# Patient Record
Sex: FEMALE | Race: WHITE | NOT HISPANIC OR LATINO | Employment: OTHER | URBAN - METROPOLITAN AREA
[De-identification: names, ages, dates, MRNs, and addresses within clinical notes are randomized per-mention and may not be internally consistent; named-entity substitution may affect disease eponyms.]

---

## 2017-08-11 ENCOUNTER — HOSPITAL ENCOUNTER (INPATIENT)
Facility: HOSPITAL | Age: 82
LOS: 1 days | Discharge: HOME HEALTH | DRG: 082 | End: 2017-08-12
Attending: PSYCHIATRY & NEUROLOGY | Admitting: PSYCHIATRY & NEUROLOGY
Payer: MEDICARE

## 2017-08-11 DIAGNOSIS — I60.9 SAH (SUBARACHNOID HEMORRHAGE): ICD-10-CM

## 2017-08-11 DIAGNOSIS — I48.0 PAROXYSMAL ATRIAL FIBRILLATION: ICD-10-CM

## 2017-08-11 DIAGNOSIS — I63.411 EMBOLIC STROKE INVOLVING RIGHT MIDDLE CEREBRAL ARTERY: Primary | ICD-10-CM
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
## 2017-08-11 DIAGNOSIS — I48.91 A-FIB: ICD-10-CM

## 2017-08-11 DIAGNOSIS — I48.91 ATRIAL FIBRILLATION: ICD-10-CM

## 2017-08-11 PROBLEM — I63.9 SMALL VESSEL STROKE: Status: ACTIVE | Noted: 2017-08-11

## 2017-08-11 PROBLEM — I63.511 ACUTE ISCHEMIC RIGHT MCA STROKE: Status: ACTIVE | Noted: 2017-08-11

## 2017-08-11 LAB
ABO + RH BLD: NORMAL
ALBUMIN SERPL BCP-MCNC: 3.4 G/DL
ALP SERPL-CCNC: 54 U/L
ALT SERPL W/O P-5'-P-CCNC: 13 U/L
ANION GAP SERPL CALC-SCNC: 12 MMOL/L
AST SERPL-CCNC: 29 U/L
BILIRUB SERPL-MCNC: 0.6 MG/DL
BLD GP AB SCN CELLS X3 SERPL QL: NORMAL
BUN SERPL-MCNC: 15 MG/DL
CALCIUM SERPL-MCNC: 9.5 MG/DL
CHLORIDE SERPL-SCNC: 104 MMOL/L
CHOLEST/HDLC SERPL: 2.3 {RATIO}
CO2 SERPL-SCNC: 21 MMOL/L
CREAT SERPL-MCNC: 0.8 MG/DL
EST. GFR  (AFRICAN AMERICAN): >60 ML/MIN/1.73 M^2
EST. GFR  (NON AFRICAN AMERICAN): >60 ML/MIN/1.73 M^2
ESTIMATED AVG GLUCOSE: 103 MG/DL
GLUCOSE SERPL-MCNC: 98 MG/DL
HBA1C MFR BLD HPLC: 5.2 %
HDL/CHOLESTEROL RATIO: 44.4 %
HDLC SERPL-MCNC: 124 MG/DL
HDLC SERPL-MCNC: 55 MG/DL
LDLC SERPL CALC-MCNC: 56.6 MG/DL
NONHDLC SERPL-MCNC: 69 MG/DL
POCT GLUCOSE: 92 MG/DL (ref 70–110)
POTASSIUM SERPL-SCNC: 4 MMOL/L
PROT SERPL-MCNC: 6.4 G/DL
SODIUM SERPL-SCNC: 137 MMOL/L
TRIGL SERPL-MCNC: 62 MG/DL
TSH SERPL DL<=0.005 MIU/L-ACNC: 3.26 UIU/ML

## 2017-08-11 PROCEDURE — 20600001 HC STEP DOWN PRIVATE ROOM

## 2017-08-11 PROCEDURE — 63600175 PHARM REV CODE 636 W HCPCS: Performed by: PHYSICIAN ASSISTANT

## 2017-08-11 PROCEDURE — G8978 MOBILITY CURRENT STATUS: HCPCS | Mod: CJ

## 2017-08-11 PROCEDURE — G8988 SELF CARE GOAL STATUS: HCPCS | Mod: CI

## 2017-08-11 PROCEDURE — G8987 SELF CARE CURRENT STATUS: HCPCS | Mod: CK

## 2017-08-11 PROCEDURE — 97162 PT EVAL MOD COMPLEX 30 MIN: CPT

## 2017-08-11 PROCEDURE — 80061 LIPID PANEL: CPT

## 2017-08-11 PROCEDURE — 99233 SBSQ HOSP IP/OBS HIGH 50: CPT | Mod: ,,, | Performed by: PSYCHIATRY & NEUROLOGY

## 2017-08-11 PROCEDURE — 95816 EEG AWAKE AND DROWSY: CPT | Mod: 26,,, | Performed by: PSYCHIATRY & NEUROLOGY

## 2017-08-11 PROCEDURE — 97530 THERAPEUTIC ACTIVITIES: CPT

## 2017-08-11 PROCEDURE — 97802 MEDICAL NUTRITION INDIV IN: CPT

## 2017-08-11 PROCEDURE — 84443 ASSAY THYROID STIM HORMONE: CPT

## 2017-08-11 PROCEDURE — 86900 BLOOD TYPING SEROLOGIC ABO: CPT

## 2017-08-11 PROCEDURE — 25000003 PHARM REV CODE 250: Performed by: STUDENT IN AN ORGANIZED HEALTH CARE EDUCATION/TRAINING PROGRAM

## 2017-08-11 PROCEDURE — 92610 EVALUATE SWALLOWING FUNCTION: CPT

## 2017-08-11 PROCEDURE — 80053 COMPREHEN METABOLIC PANEL: CPT

## 2017-08-11 PROCEDURE — G8997 SWALLOW GOAL STATUS: HCPCS | Mod: CH

## 2017-08-11 PROCEDURE — 99222 1ST HOSP IP/OBS MODERATE 55: CPT | Mod: ,,, | Performed by: NURSE PRACTITIONER

## 2017-08-11 PROCEDURE — 86901 BLOOD TYPING SEROLOGIC RH(D): CPT

## 2017-08-11 PROCEDURE — 99233 SBSQ HOSP IP/OBS HIGH 50: CPT | Mod: GC,,, | Performed by: PSYCHIATRY & NEUROLOGY

## 2017-08-11 PROCEDURE — 92523 SPEECH SOUND LANG COMPREHEN: CPT

## 2017-08-11 PROCEDURE — 95816 EEG AWAKE AND DROWSY: CPT

## 2017-08-11 PROCEDURE — G8989 SELF CARE D/C STATUS: HCPCS | Mod: CK

## 2017-08-11 PROCEDURE — G8979 MOBILITY GOAL STATUS: HCPCS | Mod: CI

## 2017-08-11 PROCEDURE — 97165 OT EVAL LOW COMPLEX 30 MIN: CPT

## 2017-08-11 PROCEDURE — 83036 HEMOGLOBIN GLYCOSYLATED A1C: CPT

## 2017-08-11 PROCEDURE — G8996 SWALLOW CURRENT STATUS: HCPCS | Mod: CH

## 2017-08-11 RX ORDER — ASPIRIN 325 MG
325 TABLET ORAL DAILY
Status: DISCONTINUED | OUTPATIENT
Start: 2017-08-12 | End: 2017-08-12

## 2017-08-11 RX ORDER — NICARDIPINE HYDROCHLORIDE 0.2 MG/ML
2.5 INJECTION INTRAVENOUS CONTINUOUS
Status: DISCONTINUED | OUTPATIENT
Start: 2017-08-11 | End: 2017-08-11

## 2017-08-11 RX ORDER — HYDRALAZINE HYDROCHLORIDE 20 MG/ML
10 INJECTION INTRAMUSCULAR; INTRAVENOUS EVERY 6 HOURS PRN
Status: DISCONTINUED | OUTPATIENT
Start: 2017-08-11 | End: 2017-08-12

## 2017-08-11 RX ORDER — ACETAMINOPHEN 325 MG/1
650 TABLET ORAL EVERY 6 HOURS PRN
Status: DISCONTINUED | OUTPATIENT
Start: 2017-08-11 | End: 2017-08-12 | Stop reason: HOSPADM

## 2017-08-11 RX ORDER — LABETALOL HYDROCHLORIDE 5 MG/ML
10 INJECTION, SOLUTION INTRAVENOUS
Status: DISCONTINUED | OUTPATIENT
Start: 2017-08-11 | End: 2017-08-11

## 2017-08-11 RX ORDER — ATORVASTATIN CALCIUM 20 MG/1
40 TABLET, FILM COATED ORAL DAILY
Status: DISCONTINUED | OUTPATIENT
Start: 2017-08-11 | End: 2017-08-12 | Stop reason: HOSPADM

## 2017-08-11 RX ORDER — SODIUM CHLORIDE 0.9 % (FLUSH) 0.9 %
3 SYRINGE (ML) INJECTION EVERY 8 HOURS
Status: DISCONTINUED | OUTPATIENT
Start: 2017-08-11 | End: 2017-08-12 | Stop reason: HOSPADM

## 2017-08-11 RX ORDER — METOPROLOL TARTRATE 25 MG/1
25 TABLET, FILM COATED ORAL 2 TIMES DAILY
Status: DISCONTINUED | OUTPATIENT
Start: 2017-08-11 | End: 2017-08-12 | Stop reason: HOSPADM

## 2017-08-11 RX ORDER — LISINOPRIL 10 MG/1
10 TABLET ORAL DAILY
Status: DISCONTINUED | OUTPATIENT
Start: 2017-08-11 | End: 2017-08-12 | Stop reason: HOSPADM

## 2017-08-11 RX ORDER — MIDAZOLAM HYDROCHLORIDE 1 MG/ML
1 INJECTION INTRAMUSCULAR; INTRAVENOUS ONCE AS NEEDED
Status: ACTIVE | OUTPATIENT
Start: 2017-08-11 | End: 2017-08-11

## 2017-08-11 RX ORDER — LABETALOL HYDROCHLORIDE 5 MG/ML
10 INJECTION, SOLUTION INTRAVENOUS EVERY 4 HOURS PRN
Status: DISCONTINUED | OUTPATIENT
Start: 2017-08-11 | End: 2017-08-12 | Stop reason: HOSPADM

## 2017-08-11 RX ORDER — MIDAZOLAM HYDROCHLORIDE 1 MG/ML
1 INJECTION INTRAMUSCULAR; INTRAVENOUS ONCE
Status: COMPLETED | OUTPATIENT
Start: 2017-08-11 | End: 2017-08-12

## 2017-08-11 RX ORDER — ONDANSETRON 8 MG/1
8 TABLET, ORALLY DISINTEGRATING ORAL EVERY 8 HOURS PRN
Status: DISCONTINUED | OUTPATIENT
Start: 2017-08-11 | End: 2017-08-12 | Stop reason: HOSPADM

## 2017-08-11 RX ADMIN — ATORVASTATIN CALCIUM 40 MG: 20 TABLET, FILM COATED ORAL at 08:08

## 2017-08-11 RX ADMIN — METOPROLOL TARTRATE 25 MG: 25 TABLET ORAL at 08:08

## 2017-08-11 RX ADMIN — LISINOPRIL 10 MG: 10 TABLET ORAL at 08:08

## 2017-08-11 RX ADMIN — HYDRALAZINE HYDROCHLORIDE 10 MG: 20 INJECTION INTRAMUSCULAR; INTRAVENOUS at 06:08

## 2017-08-11 NOTE — NURSING
"I spoke with Physical Therapy and was told that patient was mid-assist and could sit in the chair only if someone was in the room watching her at all times. I relayed that to the daughter word for word and she stated, "I won't leave her alone."   "

## 2017-08-11 NOTE — PLAN OF CARE
Transition of care note    Transfer to room 727 A    DX:SAH (subarachnoid hemorrhage) [I60.9]  SAH (subarachnoid hemorrhage) [I60.9]     Extended Emergency Contact Information  Primary Emergency Contact: Nidia Coleman   [facility]  Home Phone: [phone]  Mobile Phone: [phone]  Relation: Daughter  Preferred language: English   needed? No     PT/OT:Discharge Facility/Level Of Care Needs: home with home health, home health PT      Insurance:Payor: [employer] MANAGED MEDICARE / Plan: HU[employer] MEDICARE HMO / Product Type: Capitation /      PCP:Gracia Martinez MD     Pharmacy:  [facility] - [place], [place] - [address]  [address] [facility] [address]  Phone: [phone] Fax: [phone]    [facility] [address] [place] [address] [facility]  [address] [facility] [address]  Phone: [phone] Fax: [phone]      No future appointments.     Carmen Gómez [facility]  [id number]

## 2017-08-11 NOTE — ASSESSMENT & PLAN NOTE
Xarelto reportedly DCd 1-2 weeks ago by outpatient cardiologist.  Hold for now  Metoprolol for rate control; currently rate controlled  Reportedly on sotalol at home.  Clarify with patient's daughter when she arrives.  If unclear reasoning for sotalol, consider consulting cardiology before restarting  EKG  TTE 8/10 at OSH shows mod LAE with normal EF and normal diastolic dysfunction

## 2017-08-11 NOTE — NURSING
Patient to room from ICU in the bed with RN at her side. Patient denies any complaints.VSS. Patient and daughter oriented to room and nurse call bell. I asked them to not get out of the bed until I find out her ambulation restrictions. Said they would comply.

## 2017-08-11 NOTE — PLAN OF CARE
Problem: Occupational Therapy Goal  Goal: Occupational Therapy Goal  Goals set 8/11 to be addressed for 7 days with expiration date, 8/18:  Patient will increase functional independence with ADLs by performing:    Patient will demonstrate rolling to the right with modified independence.  Not met   Patient will demonstrate rolling to the left with modified independence.   Not met  Patient will demonstrate supine -sit with modified independence.   Not met  Patient will demonstrate stand pivot transfers with modified independence.   Not met  Patient will demonstrate grooming while standing with modified independence.   Not met  Patient will demonstrate upper body dressing with modified independence.   Not met  Patient will demonstrate lower body dressing with modified independence.   Not met  Patient will demonstrate toileting with modified independence.   Not met  Patient's family / caregiver will demonstrate independence and safety with assisting patient with self-care skills and functional mobility.     Not met  Patient and/or patient's family will verbalize understanding of stroke prevention guidelines, personal risk factors and stroke warning signs via teachback method.  Not met         OT evaluation completed.  RISHI Kate  8/11/2017

## 2017-08-11 NOTE — CONSULTS
Inpatient consult to Physical Medicine Rehab  Consult performed by: JESSY HOLLOWAY  Consult ordered by: ERIC NAVARRO  Reason for consult: Assess rehab needs        Reviewed patient history and current admission.  Rehab team following.  Full consult to follow.    MABEL Riley, FNP-C  Physical Medicine & Rehabilitation   08/11/2017  Spectralink: [id number]

## 2017-08-11 NOTE — HOSPITAL COURSE
8/11/17: Evaluated by therapy.  Bed mobility SV.  Sit to stand CGA and transfers CGA.  UBD SBA and LBD CGA.    AM-PAC 6 CLICK MOBILITY (PT) - Total score: pending   AM-PAC 6 CLICK ADL (OT) - Total score: 18 (8/11)    AM-PAC Raw Score Level of Impairment Assistance   6 100% Total / Unable   7 - 8 80 - 100% Maximal Assist   9-13 60 - 80% Moderate Assist   14 - 19 40 - 60% Moderate Assist   20 - 22 20 - 40% Minimal Assist   23 1-20% SBA / CGA   24 0% Independent/ Mod I

## 2017-08-11 NOTE — NURSING TRANSFER
Nursing Transfer Note      8/11/2017     Transfer To: 727A    Transfer via bed    Transfer with cardiac monitoring    Transported by RN    Medicines sent: none    Chart send with patient: Yes    Notified: daughter at bedside    Receiving RN at bedside    Upon arrival to floor: cardiac monitor applied, patient oriented to room, call bell in reach and bed in lowest position

## 2017-08-11 NOTE — PLAN OF CARE
SW met with Pt and Pt daughter at bedside. Discussed therapy recs for HH and gave list. Reported PT had mentioned interest by the family for an aid at home. Gave resources for this. Pt daughter will start to price it out. Pt does have lifeline necklace and communicators already.     Aubree Jefferson, MAURICE  Neurocritical Care   [facility]  [id number]

## 2017-08-11 NOTE — CONSULTS
[facility]  Vascular Neurology  Comprehensive Stroke Center  Consult Note    Inpatient consult to Vascular (Stroke) Neurology  Consult performed by: KENNETH MILLER  Consult ordered by: ERIC NAVARRO        Assessment/Plan:     Patient is a 89 y.o. year old female with:    * Embolic stroke involving right middle cerebral artery    89 yo with pAF and HTN, CAD stroke risk factors (Chadsvasc 7) presents with SAH and stroke like symptoms  -hold antiplts/anticoagulation pending MRI w/o, MRA brain ordered 8/11  -PT/OT/SLP rehab efforts  -follow up ECHO, admission baseline EKG as patient has high risk for cardioembolic stroke given arrythmia  -continue high intensity statin therapy  -care per NCCU at this time.   -2D echo from 8/10/17 showed Left atrial enlargement  -primary team holding chemical DVT ppx for now          Paroxysmal atrial fibrillation    -patient has been off Xarelto for pAF x1-2 weeks after risk benefit discussions. Will reassess need for NOAC such as Pradaxa with reversal agents  -hold antiplatelets in light of recent head imaging concern for bleed until MRI obtained CHADSVASC score 7, HASBLED 3.   -home med rate control and consider Cardiology consult- patient has been managed on sotalol in past        Hypertension, benign    -home lisinopril, home Lopressor and BP target less than 180 SBP in infarct, 140 if bleeding        CAD (coronary artery disease)    -continue ACEi, BB, statin, care per Primary            Thrombolysis Candidate? No  1. Contraindications: Unclear onset of symptoms and CT findings (ICH, SAH, major infact sign)  2. Warnings: Recent intracranial hemorrhage     Interventional Revascularization Candidate?  Pending imaging    Research Candidate? will assess chart after imaging    Subjective:     History of Present Illness:  Ms. Beck is a 89 yo F with pAF previously on Xarelto (discontinued 2 weeks prior to admission after fall risk assessed by outside  physician),  CAD s/p CABG on daily PHD814, HTN, CKD, HPLD was admitted to [facility] on 8/10/17 with ~20 minutes of dysarthria and confusion that began at 10am and CT head after a fall showed an intraparenchymal hemorrhage. Patient also reportedly had a recurrence of confusion/dyarthria intermittenly in the few hours after presenting to the OSH. Patient has been intermittently symptomatic and stroke workup was performed at [place]. Last night, she was found down on the floor, unwitnessed, +LOC with 3 minor scalp lacerations, and patient was AOx1 at the time of the event. Repeat CT head showed punctate lesions in R MCA distribution and questionable blood, and she was [place] [facility] [place] [facility] [place] higher level of care and Vascular Neuro consultation.          Past Medical History:   Diagnosis Date    AF (paroxysmal atrial fibrillation)     CAD (coronary artery disease) 1970    CABG x 4    HTN (hypertension), benign     Hyperlipidemia LDL goal <70      Past Surgical History:   Procedure Laterality Date    CARDIOVERSION  2/05 & 10/05    CHOLECYSTECTOMY      CORONARY ARTERY BYPASS GRAFT  1994    x 3    HYSTERECTOMY       Family History   Problem Relation Age of Onset    Cerebral aneurysm Mother     Cancer Neg Hx      Social History   Substance Use Topics    Smoking status: Never Smoker    Smokeless tobacco: Never Used    Alcohol use 4.2 oz/week     7 Glasses of wine per week     Review of patient's allergies indicates:   Allergen Reactions    No known drug allergies      Medications: I have reviewed the current medication administration record.    Prescriptions Prior to Admission   Medication Sig Dispense Refill Last Dose    glucosamine sulfate 2KCl 1,000 mg Tab Every day   Taking    lisinopril 10 MG tablet Take 1 tablet (10 mg total) by mouth once daily. 90 tablet 3 Taking    metoprolol tartrate (LOPRESSOR) 25 MG tablet Take 25 mg by mouth 2 (two) times daily.    Taking    rivaroxaban  (XARELTO) 10 mg Tab Take 10 mg by mouth daily with dinner or evening meal.   Taking    simvastatin (ZOCOR) 40 MG tablet Take 1 tablet (40 mg total) by mouth every evening. 90 tablet 0 Taking       Review of Systems   Eyes: Negative for visual disturbance.   Respiratory: Negative for cough and shortness of breath.    Cardiovascular: Negative for chest pain and palpitations.   Gastrointestinal: Negative for constipation, diarrhea, nausea and vomiting.   Genitourinary: Negative for frequency and urgency.   Musculoskeletal: Positive for gait problem.   Skin: Positive for wound (multiple small head lacs).   Neurological: Positive for syncope and speech difficulty. Negative for weakness and headaches.   Psychiatric/Behavioral: Positive for confusion.     Objective:     Vital Signs (Most Recent):  Temp: 98 °F (36.7 °C) (08/11/17 0535)  Pulse: 86 (08/11/17 0630)  Resp: 20 (08/11/17 0630)  BP: 138/63 (08/11/17 0630)  SpO2: 98 % (08/11/17 0630)    Vital Signs Range (Last 24H):  Temp:  [98 °F (36.7 °C)]   Pulse:  [73-96]   Resp:  [18-24]   BP: (138-161)/(63-81)   SpO2:  [98 %-100 %]     Physical Exam   Constitutional: She appears well-developed and well-nourished. No distress.   HENT:   Head: Normocephalic and atraumatic.   Left Ear: External ear normal.   Mouth/Throat: Oropharynx is clear and moist.   Eyes: Conjunctivae are normal. Pupils are equal, round, and reactive to light. Left eye exhibits no discharge. No scleral icterus.   Neck: No tracheal deviation present. No thyromegaly present.   Cardiovascular: irregular rate and normal heart sounds.  Exam reveals no gallop and no friction rub.    No murmur heard.  Pulmonary/Chest: Effort normal.   Abdominal: Soft. She exhibits no distension. There is no tenderness.   Musculoskeletal: Normal range of motion.   Neurological: She is alert. She has normal strength and normal reflexes. She displays normal reflexes. No cranial nerve deficit or sensory deficit. Coordination  abnormal.   Skin: Skin is warm and dry. She is not diaphoretic.   Small lacs to posterior head, with ecchymosis to L neck   Nursing note and vitals reviewed.      Neurological Exam:   Oriented to person and hospital  5/5 bilateral elbow flexion and extension, and bilateral hip flexion  Normal tone  Sensation intact to light touch throughout  Normal finger to nose bilaterally    Stroke Scales  Current NIH Stroke Score Values    Flowsheet Row Most Recent Value   Interval  baseline (upon arrival/admit)   1a. Level Of Consciousness  0-->Alert: keenly responsive   1b. LOC Questions  0-->Answers both questions correctly   1c. LOC Commands  0-->Performs both tasks correctly   2. Best Gaze  0-->Normal   3. Visual  0-->No visual loss   4. Facial Palsy  0-->Normal symmetrical movements   5a. Motor Arm, Left  0-->No drift: limb holds 90 (or 45) degrees for full 10 secs   5b. Motor Arm, Right  0-->No drift: limb holds 90 (or 45) degrees for full 10 secs   6a. Motor Leg, Left  0-->No drift: leg holds 30 degree position for full 5 secs   6b. Motor Leg, Right  0-->No drift: leg holds 30 degree position for full 5 secs   7. Limb Ataxia  0-->Present in one limb   8. Sensory  0-->Normal: no sensory loss   9. Best Language  0-->No aphasia: normal   10. Dysarthria  0-->Normal   11. Extinction and Inattention (formerly Neglect)  0-->No abnormality   Total (NIH Stroke Scale)  0        Mod shayy: 1- given frailty at baseline  GCS: 15    Laboratory:  CMP: No results for input(s): GLUCOSE, CALCIUM, ALBUMIN, PROT, NA, K, CO2, CL, BUN, CREATININE, ALKPHOS, ALT, AST, BILITOT in the last 24 hours.  BMP: No results for input(s): GLUCOSE, NA, K, CL, CO2, BUN, CREATININE, CALCIUM in the last 168 hours.  CBC: No results for input(s): WBC, RBC, HGB, HCT, PLT, MCV, MCH, MCHC in the last 168 hours.  Lipid Panel: No results for input(s): CHOL, LDLCALC, HDL, TRIG in the last 168 hours.  Coagulation: No results for input(s): INR, APTT in the last 168  hours.    Invalid input(s): PT  Platelet Aggregation Study: No results for input(s): PLTAGG, PLTAGINTERP, PLTAGREGLACO, ADPPLTAGGREG in the last 168 hours.  Hgb A1C: No results for input(s): HGBA1C in the last 168 hours.  TSH: No results for input(s): TSH in the last 168 hours.    Diagnostic Results:  Brain Imaging: CT Head. Date: 8/10/11 Reviewed; shows small SAH  Awaiting MRI brain w/o 8/11/17      Cardiac Evaluation: atrial fib with RVR on previous EKGs. Awaiting current admission EKG        Ben White MD  [facility]  Department of Vascular Neurology   [facility]

## 2017-08-11 NOTE — PT/OT/SLP EVAL
"Occupational Therapy  Evaluation/Treatment    Sravani Beck   MRN: [id number]   Admitting Diagnosis: Embolic stroke involving right middle cerebral artery    OT Date of Treatment: 08/11/17   OT Start Time: 0800  OT Stop Time: 0829  OT Total Time (min): 29 min    Billable Minutes:  Evaluation 18  Therapeutic Activity 11    Diagnosis: Embolic stroke involving right middle cerebral artery     Past Medical History:   Diagnosis Date    AF (paroxysmal atrial fibrillation)     CAD (coronary artery disease) 1970    CABG x 4    HTN (hypertension), benign     Hyperlipidemia LDL goal <70       Past Surgical History:   Procedure Laterality Date    CARDIOVERSION  2/05 & 10/05    CHOLECYSTECTOMY      CORONARY ARTERY BYPASS GRAFT  1994    x 3    HYSTERECTOMY         Referring physician: Stanley  Date referred to OT: 8/11  General Precautions: Standard, aspiration, fall, NPO, seizure  Orthopedic Precautions: N/A  Braces: N/A    Do you have any cultural, spiritual, Buddhist conflicts, given your current situation?: Mu-ism     Patient History:  Prior level of function:   Patient resides in [place] alone in one story home with no steps to enter.  PTA patient independent with ADLs, not driving.  Patient is left handed.  DME:  rolling walker.  Hobbies:  playing cards.  Roles and responsibilities:  taking care of pet (dog named Pepper)     Subjective:  Communicated with nurse prior to session.  Patient:  "I don't remember what happened."  Son: "She has been having memory issues for the last 2 years."    Pain/Comfort  Pain Rating 1: 0/10  Pain Rating Post-Intervention 1: 0/10    Objective:  Patient found with: blood pressure cuff, peripheral IV, telemetry, SCD  Son present.    Cognitive Exam:  Oriented to: Person and Place  Follows Commands/attention: Follows one-step commands  Communication: clear/fluent  Memory:  Impaired STM  Safety awareness/insight to disability: impaired  Coping skills/emotional control: Appropriate to " situation    Visual/perceptual:  Intact    Physical Exam:  Postural examination/scapula alignment: Rounded shoulder  Skin integrity: Bruising of bilateral UE; laceration posterior head  Edema: None noted     Sensation:   Intact    Upper Extremity Range of Motion:  Right Upper Extremity: WNL  Left Upper Extremity: WNL    Upper Extremity Strength:  Right Upper Extremity: WNL  Left Upper Extremity: WNL    Functional Mobility:  Bed Mobility:  Rolling/Turning to Left: Supervision  Rolling/Turning Right: Supervision  Scooting/Bridging: Supervision  Supine to Sit: Stand by Assistance  Sit to Supine: Stand by Assistance    Transfers:  Sit <> Stand Assistance: Contact Guard Assistance  Sit <> Stand Assistive Device: No Assistive Device  Bed <> Chair Technique: Stand Pivot  Bed <> Chair Transfer Assistance: Contact Guard Assistance    Activities of Daily Living:  Feeding Level of Assistance:  (NPO)  UE Dressing Level of Assistance: Stand by assistance  LE Dressing Level of Assistance: Contact guard  Grooming Position: Standing  Grooming Level of Assistance: Contact guard assistance     Additional Treatment:   Patient/ Family education provided for stroke warning signs, prevention guidelines and personal risk factors.  Family verbalizing understanding via teach back method.   Patient education provided on role of OT and need for HH OT upon discharge.  Patient verbalizing understanding via teach back method. Patient and family instructed on need to call for assistance for toileting needs and when getting up.  Continued education, patient/ family training recommended.  Patient alert and oriented x person and place.  Able to follow 4/4 one step commands.  Patient attentive and interactive throughout the session.  Patient able to identify 5/5 body parts.  Able to name 5/5 objects.  Able to sequence 7/7 days of the week and 11/12 months of the year (assistance required for initiation).  Patient's functional status and disposition  "recommendation discussed with patient, and MD.  White board updated in patient's room.  OT asked if there were any other questions; patient/ family had no further questions.      AM-PAC 6 CLICK ADL  How much help from another person does this patient currently need?  1 = Unable, Total/Dependent Assistance  2 = A lot, Maximum/Moderate Assistance  3 = A little, Minimum/Contact Guard/Supervision  4 = None, Modified [place]/Independent    Putting on and taking off regular lower body clothing? : 3  Bathing (including washing, rinsing, drying)?: 3  Toileting, which includes using toilet, bedpan, or urinal? : 3  Putting on and taking off regular upper body clothing?: 3  Taking care of personal grooming such as brushing teeth?: 3  Eating meals?: 3  Total Score: 18    AM-PAC Raw Score CMS "G-Code Modifier Level of Impairment Assistance   6 % Total / Unable   7 - 9 CM 80 - 100% Maximal Assist   10-14 CL 60 - 80% Moderate Assist   15 - 19 CK 40 - 60% Moderate Assist   20 - 22 CJ 20 - 40% Minimal Assist   23 CI 1-20% SBA / CGA   24 CH 0% Independent/ Mod I       Patient left supine with all lines intact, call button in reach and bed alarm on    Assessment:  Sravani Beck is a 89 y.o. female with a medical diagnosis of Embolic stroke involving right middle cerebral artery and presents with performance deficits of physical skills including impaired balance, mobility, gross motor coordination, and endurance; demonstrating performance deficits of cognitive skills including impaired problem solving, sequencing and memory all resulting in inability organizing occupational performance in a timely and safe manner; demonstrating performance deficits of psychosocial skills including impairments of interpersonal interactions and coping strategies which are skills necessary to successfully and appropriately participate in everyday tasks and social situations.  These performance deficits have resulted in activity limitations " including but not limited to:  bed mobility, transfers, ascending/ descending stairs, walking short and long distances, walking around obstacles, transitional movement patterns (kneeling, bending); eating, upper body dressing, lower body dressing, brushing teeth, toileting, bathing, carrying objects, balancing checkbook, shopping, and meal preparation.    Patient's role as pet owner and independent caretaker for self has been affected. Patient will benefit from skilled OT services to maximize level of independence with self-care skills and functional mobility.  Will benefit from HH OT.    Pt evaluation falls under low complexity for evaluation coding due to performance deficits noted in 1-3 areas as stated above and 0 co-morbities affecting current functional status. Data obtained from problem focused assessments. No modifications or assistance was required for completion of evaluation. Only brief occupational profile and history review completed.    Rehab identified problem list/impairments: Rehab identified problem list/impairments: weakness, impaired endurance, impaired self care skills, impaired sensation, impaired functional mobilty, gait instability, impaired balance, impaired cognition    Rehab potential is good.    Activity tolerance: Good    Discharge recommendations: Discharge Facility/Level Of Care Needs: home health OT     Barriers to discharge: Barriers to Discharge: Decreased caregiver support    Equipment recommendations: bath bench     GOALS:    Occupational Therapy Goals        Problem: Occupational Therapy Goal    Goal Priority Disciplines Outcome Interventions   Occupational Therapy Goal     OT, PT/OT     Description:  Goals set 8/11 to be addressed for 7 days with expiration date, 8/18:  Patient will increase functional independence with ADLs by performing:    Patient will demonstrate rolling to the right with modified independence.  Not met   Patient will demonstrate rolling to the left with  modified independence.   Not met  Patient will demonstrate supine -sit with modified independence.   Not met  Patient will demonstrate stand pivot transfers with modified independence.   Not met  Patient will demonstrate grooming while standing with modified independence.   Not met  Patient will demonstrate upper body dressing with modified independence.   Not met  Patient will demonstrate lower body dressing with modified independence.   Not met  Patient will demonstrate toileting with modified independence.   Not met  Patient's family / caregiver will demonstrate independence and safety with assisting patient with self-care skills and functional mobility.     Not met  Patient and/or patient's family will verbalize understanding of stroke prevention guidelines, personal risk factors and stroke warning signs via teachback method.  Not met                           PLAN:  Patient to be seen 6 x/week to address the above listed problems via self-care/home management, therapeutic exercises, therapeutic activities, neuromuscular re-education, sensory integration, cognitive retraining  Plan of Care expires: 09/09/17  Plan of Care reviewed with: patient, son    OT G-codes  Functional Assessment Tool Used: FIM  Score: 4  Functional Limitation: Self care  Self Care Current Status (): CK  Self Care Goal Status (): RISHI Lowe  08/11/2017

## 2017-08-11 NOTE — PT/OT/SLP EVAL
Physical Therapy  Evaluation    Sravani Beck   MRN: [id number]   Admitting Diagnosis: Embolic stroke involving right middle cerebral artery    PT Received On: 08/11/17  PT Start Time: 1051     PT Stop Time: 1115    PT Total Time (min): 24 min       Billable Minutes:  Evaluation 24    Diagnosis: Embolic stroke involving right middle cerebral artery    Comorbidities and personal factors that affect the PT plan of care or the patient's ability to participation or progress with therapy:  1. A-fib with active A-fib during evaluation and vital signs monitored  2. CAG s/p CABG    Clinical Presentation: evolving/changing characteristics   Patient evaluated in the ICU with continuous monitoring of vital signs.  Activity pacing practiced by therapist during eval d/t the presence of intermittent A-fib during evaluation.      Level of Complexity:   Moderate Complexity:   · At least 1-2 personal factors or comorbidities that impact the plan of care  · Examination addressing at least 3 body structures and functions, activity limitations, and/or participation restrictions  · Clinical presentation with evolving or changing characteristics    Past Medical History:   Diagnosis Date    AF (paroxysmal atrial fibrillation)     CAD (coronary artery disease) 1970    CABG x 4    HTN (hypertension), benign     Hyperlipidemia LDL goal <70       Past Surgical History:   Procedure Laterality Date    CARDIOVERSION  2/05 & 10/05    CHOLECYSTECTOMY      CORONARY ARTERY BYPASS GRAFT  1994    x 3    HYSTERECTOMY         Referring physician: Teri Angel  Date referred to PT: 8/11/2017    General Precautions: Standard, aspiration, fall    Patient History:  Living Environment Comment: Patient lived alone in [place] in a 1 story home with threshold step to enter.  Pt was independent and used a Rollator walker for household ambulation, and BSC next to the bed.  Her family lives close and checks on her throughout the day. Family anticipates  "having patient to discharge to a family member's (estefany) home for 24 hour supervision and assistance at time of discharge.  Equipment Currently Used at Home:  (Rollator walker, [facility] [place])    Subjective:  Communicated with RN prior to session.  "Can you come back later so I can greet my visitors?"  Chief Complaint: none stated  Patient goals: none stated    Pain/Comfort  Pain Rating 1: 0/10  Pain Rating Post-Intervention 1: 0/10      Objective:   Patient found with: telemetry, pulse ox (continuous), blood pressure cuff, bed alarm   Patient found supine in bed with bloody drainage on pillow (old) but no active bleeding identified.  She agreed to therapy.  Pt experienced an unsupervised fall at previous hospital causing a head laceration an SAH.     EXAMINATION    Cognitive Function:  Oriented to: person, "[place]" for place, "71" for year  Level of Alertness: awake and alert  Follows Commands/attention: Follows two-step commands  Communication: clear/fluent  Safety awareness/insight to disability: impaired decreased safety awareness    Musculoskeletal System  Lower Extremities:  Range of Motion:  Right Lower Extremity: WFL  Left Lower Extremity: WFL    Strength:  Right Lower Extremity: WFL  Left Lower Extremity: WFL    Muscular Tone:normal      Integumentary System:  Skin integrity: Visible skin intact    Cardiopulmonary System:  Edema: None noted     Neuromuscular System:  Sensation:   Light Touch (LT): intact   Deep Pressure (DP): intact     Coordination: WFL    Balance:   Static Sit: GOOD: Takes MODERATE challenges from all directions; good midline orientation   Dynamic Sit: GOOD: Maintains balance through MODERATE excursions of active trunk movement;   Static Stand: POOR+: Needs MINIMAL assist to maintain; L LOB  Dynamic stand: POOR: N/A; see gait    Posture and gross symmetry: Rounded shoulder, Head forward and Posterior pelvic tilt    FUNCTIONAL MOBILITY ASSESSMENT:  Bed Mobility:  Rolling/Turning R: Independent "   Rolling/Turning L: Independent   Supine to sit: stand by assist   Sit to supine: stand by assist    Scooting EOB: stand by assist      Transfers:  Sit to stand transfer: min assist for balance with L sided LOB    Bed to chair transfer: min assist     Gait:   Pt ambulated 10 feet x2 with min assist required for balance.   Gait deviations   · L sided LOB requiring min assist to stabilize  · Decreased gait speed  · Increased time in double support   · Decreased bilateral step length    THERAPEUTIC ACTIVITIES AND EXERCISES:  Therapist educated patient on the following:  · Role of PT, POC, d/c planning  · Safety with mobility  · No OOB mobility without assist    Pt sat a total of 5 minutes EOB with supervision.     AM-PAC 6 CLICK MOBILITY  How much help from another person does this patient currently need?   1 = Unable, Total/Dependent Assistance  2 = A lot, Maximum/Moderate Assistance  3 = A little, Minimum/Contact Guard/Supervision  4 = None, Modified [place]/Independent    Turning over in bed (including adjusting bedclothes, sheets and blankets)?: 4  Sitting down on and standing up from a chair with arms (e.g., wheelchair, bedside commode, etc.): 3  Moving from lying on back to sitting on the side of the bed?: 4  Moving to and from a bed to a chair (including a wheelchair)?: 3  Need to walk in hospital room?: 3  Climbing 3-5 steps with a railing?: 3  Total Score: 20     AM-PAC Raw Score CMS G-Code Modifier Level of Impairment Assistance   6 % Total / Unable   7 - 9 CM 80 - 100% Maximal Assist   10 - 14 CL 60 - 80% Moderate Assist   15 - 19 CK 40 - 60% Moderate Assist   20 - 22 CJ 20 - 40% Minimal Assist   23 CI 1-20% SBA / CGA   24 CH 0% Independent/ Mod I     Patient left supine with all lines intact, call button in reach, RN notified and family  present.    Assessment:   Sravani Beck is a 89 y.o. female with a medical diagnosis of Embolic stroke involving right middle cerebral artery.  Patient  presents with balance impairment and decreased safety with OOB mobility.  She required assistance for standing, transfers, gait and balance. Prior to admit patient was independent with Rollator walker at home.  She would benefit from skilled PT services to progress mobility and ensure safe use of assistive device prior to d/c. Family will be able to provide 24 hour supervision at home.     Rehab identified problem list/impairments: Rehab identified problem list/impairments: gait instability, impaired balance, impaired cognition, impaired functional mobilty, decreased safety awareness    Rehab potential is good.    Activity tolerance: Good    Discharge recommendations: Discharge Facility/Level Of Care Needs: home with home health, home health PT     Barriers to discharge: Barriers to Discharge: Decreased caregiver support    Equipment recommendations: Equipment Needed After Discharge: none     GOALS:    Physical Therapy Goals        Problem: Physical Therapy Goal    Goal Priority Disciplines Outcome Goal Variances Interventions   Physical Therapy Goal     PT/OT, PT Ongoing (interventions implemented as appropriate)     Description:  Goals to be met by: 2017     Patient will increase functional independence with mobility by performin. Sit to stand transfer with Stand-by Assistance  2. Bed to chair transfer with Stand-by Assistance  3. Gait  x 150 feet with Stand-by Assistance using RW or Rollator walker.   4. Pt will ascend/descend curb step with SBA and verbal cues using a RW to enter/exit home environment.   5. Stand for 10 minutes with Stand-by Assistance while performing UE tasks to enable safe participation in self care                        PLAN:    Patient to be seen 5 x/week to address the above listed problems via gait training, therapeutic activities, therapeutic exercises, neuromuscular re-education  Plan of Care expires: 17  Plan of Care reviewed with: patient, daughter, son           Gloira Gibbons, PT  08/11/2017

## 2017-08-11 NOTE — PT/OT/SLP EVAL
"Speech Language Pathology Evaluation    Sravani Beck   MRN: [id number]   Admitting Diagnosis: Embolic stroke involving right middle cerebral artery    Diet recommendations: Solid Diet Level: Regular  Liquid Diet Level: Thin     SLP Treatment Date: 08/11/17  Speech Start Time: 1115     Speech Stop Time: 1131     Speech Total (min): 16 min       TREATMENT BILLABLE MINUTES:  Eval 8  and Eval Swallow and Oral Function 8    Diagnosis: Embolic stroke involving right middle cerebral artery      Past Medical History:   Diagnosis Date    AF (paroxysmal atrial fibrillation)     CAD (coronary artery disease) 1970    CABG x 4    HTN (hypertension), benign     Hyperlipidemia LDL goal <70      Past Surgical History:   Procedure Laterality Date    CARDIOVERSION  2/05 & 10/05    CHOLECYSTECTOMY      CORONARY ARTERY BYPASS GRAFT  1994    x 3    HYSTERECTOMY         Has the patient been evaluated by SLP for swallowing? : Yes  Keep patient NPO?: No   General Precautions: Standard, fall          Social Hx: Patient lives with self  Prior diet: regular.      Subjective:  "She had some memory problem prior to this".   Pain/Comfort  Pain Rating 1: 0/10  Pain Rating Post-Intervention 1: 0/10    Objective:        Oral Musculature Evaluation  Oral Musculature: WFL  Dentition: present and adequate  Mucosal Quality: good  Mandibular Strength and Mobility: WFL  Oral Labial Strength and Mobility: WFL  Lingual Strength and Mobility: WFL  Velar Elevation: WFL  Buccal Strength and Mobility: WFL  Volitional Cough: strong  Volitional Swallow: no delay  Voice Prior to PO Intake: wfl     Cognitive Status:  Behavioral Observations: alert and appropriate-  Memory and Orientation: Pt. was oriented to time and did recall hospital name with cues with good recall of recent and remote temporal and general information.  Immediate/delayed verbal recall was wfl with pt. Repeating 7 digits and 5 words without difficulty.    Attention: wfl.  Problem Solving: " Responses to hypothetical verbal problem solving tasks were accurate and complete.  Pt. Compared and contrasted objects and generated multiple solutions to problems.  Thought organization and categorization skills were wfl with pt. Naming 13 animals given one minute when 15-20 are wnl.  Pt. Responded to functional math and time calculations with 100% accuracy   Pragmatics: wfl  Language: wfl    Auditory Comprehension: Pt. Responded to complex yes/no questions and multi step commands with 100% accuracy and no delays in responding.        Verbal Expression: Verbal language skills were wfl with no evidence of aphasia.  Pt. Expressed their thoughts coherently in conversation with no evidence of confusion or word finding deficits      Motor Speech: wfl    Voice: wfl      Reading: Pt. Orally read sentences at midline with 100% acc    Writing: [facility] for needs      Bedside Swallow Eval:  Consistencies Assessed: Thin liquids 1 cup, Puree 2 teaspoons and Solids 1/2 cracker  Oral Phase: WFL  Pharyngeal Phase: no overt clinical  signs/symptoms of aspiration and no overt clinical signs/symptoms of pharyngeal dysphagia    Additional Treatment:      FIM:  Social Interaction: 6 Complete [place]--The patient interacts appropriately with staff, other patients, and family members (e.g., controls temper, accepts criticism, is aware that words and actions have an impact on others), and does not require medication for   Problem Solvin Modified [place]--In most situations, the patient recognizes a present problem, and with only mild difficulty makes appropriate decisions, initiates and carries out a sequence of steps to solve complex problems, or requires more than a   Comprehension: 6 Modified [place]--In most situations, the patient understands readily or with only mild dificulty complex or abstract directions and conversation.  The patient does not require prompting, though (s)he may require a hearing or visual aid,  other x   Expression: 7 Complete [place]--The patient expresses complex or abstract ideas clearly and fluently (not necessarily in English).   Memory: 5 Supervision-The patient requires prompting (e.g., cueing, repetition, reminders) only under stressful or unfamiliar conditions, but no more than 10% of the time.     Assessment:  Sravani Beck is a 89 y.o. female with speech language and cogntive skills wfl.  Oral and pharyngeal phases of swallow were wfl.    Do you have any cultural, spiritual, Congregational conflicts, given your current situation?: no    Discharge recommendations: Discharge Facility/Level Of Care Needs: home     Goals:    SLP Goals        Problem: SLP Goal    Goal Priority Disciplines Outcome   SLP Goal     SLP Ongoing (interventions implemented as appropriate)                    Plan:   Patient to be seen     Plan of Care expires:    Plan of Care reviewed with: patient, family  SLP Follow-up?: No              Nisreen Jaramillo MA, CCC-SLP  08/11/2017

## 2017-08-11 NOTE — PROGRESS NOTES
[facility]  Neurocritical Care  Progress Note    Admit Date: 8/11/2017  Service Date: 08/11/2017  Length of Stay: 0    Subjective:     Chief Complaint: Embolic stroke involving right middle cerebral artery    History of Present Illness: Ms. Beck is a 89 yo F with a h/o afib (Xarelto until 2 weeks ago; sotalol), CAD s/p CABG (on ASA 325mg daily), HTN, HLD, CKD who was admitted to [place] 8/10/17 with dysarthria and confusion, which started at 10am, and then was transferred after a CTH s/p fall showed IPH.  Initially, she developed dysarthria and confusion at 10am for approximately twenty minutes, and then her symptoms resolved.  However, her symptoms waxed and waned, and she had several symptomatic episodes over the next few hours.  She was admitted to [place], and a stroke workup was started.  However, around 1:30am, she was found on the floor with three scalp lacerations - none of which required suturing.  It was unclear how, or under what circumstances, she had fallen.  She was found to be oriented to name and the fact that she was in a hospital, which was reportedly her baseline.  She was taken for a CTH, which reportedly showed ICH with small SAH (minimal to no blood seen on my review).  She was transferred to [facility] for a higher level of care.      Hospital Course: No notes on file    Review of Symptoms:   Constitutional: Denies fevers or chills.  ENT: no hearing difficulty, no visual changes  Pulmonary: Denies shortness of breath or cough.  Cardiology: Denies chest pain or palpitations.  GI: Denies abdominal pain or constipation.  Neurologic: Denies new weakness, headaches, or paresthesias.   : no dysuria  Musk: no muscle pain, no joint pain  Psych: no hallucinations    Physical Exam:  GA: Alert, comfortable, no acute distress.   HEENT: No scleral icterus or JVD.   Pulmonary: Clear to auscultation A/L. No wheezing, crackles, or rhonchi.  Cardiac: RRR S1 & S2 w/o rubs/murmurs/gallops.    Abdominal: Bowel sounds present x 4. No appreciable hepatosplenomegaly.  Skin: No jaundice, rashes, or visible lesions.  Pulses: 2+ DP bilat    Neuro:  --sedation:none  --GCS: [id number]  --Mental Status:oriented to self, mild cognitive impairment, not oriented to place    --CN II-XII grossly intact.   --Pupils 3-->2mm, PERRL.   --brainstem: intact  --Motor: 4/5 ythroughout  --sensory: intact to soft touch and pain throughout  --Reflexes:not tested  --Gait: deferred    No results for input(s): WBC, RBC, HGB, HCT, PLT, MCV, MCH, MCHC in the last 24 hours.  No results for input(s): GLUCOSE, CALCIUM, PROT, NA, K, CO2, CL, BUN, CREATININE, ALKPHOS, ALT, AST, BILITOT in the last 24 hours.    Invalid input(s):  ALBUMIN  No results for input(s): INR, APTT in the last 24 hours.    Invalid input(s): PT  Resp Rate Total:  [18 br/min] 18 br/min      I have personally reviewed all labs, imaging, and studies today      Assessment/Plan:     Neuro   * Embolic stroke involving right middle cerebral artery    Recently off xarelto  Likely cardioembolic  Will need anticoagulation as outpatient  Stroke workup  Transfer to vascular neurology service        SAH (subarachnoid hemorrhage)    Not visualized on repeat head CT          Cardiac/Vascular   Paroxysmal atrial fibrillation    Currently rate controlled  Resume home meds  Will need to be on anticoagulation again as outpatient        Hypertension, benign    Continue home metoprolol, lisinopril        CAD (coronary artery disease)    Resume ASA  Resume home meds        Orthopedic   Fall    Possible syncope workup for vascular neurology            Prophylaxis:  Venous Thromboembolism: chemical  Stress Ulcer: H2B  Ventilator Pneumonia: not applicable     Activity Orders          None        Full Code    Rishabh Pink MD  Neurocritical Care  [facility]    Level III

## 2017-08-11 NOTE — PLAN OF CARE
Problem: SLP Goal  Goal: SLP Goal  Outcome: Ongoing (interventions implemented as appropriate)  Regular diet with thin liquids recommended.    Nisreen Jaramillo MA/RHONDA-SLP  Speech Language Pathologist  Pager [phone]  8/11/2017

## 2017-08-11 NOTE — ASSESSMENT & PLAN NOTE
89 yo with pAF and HTN, CAD stroke risk factors (Chadsvasc 7+) presents with SAH and stroke like symptoms  -hold antiplts/anticoagulation  -PT/OT/SLP  -follow up MRI brain w/o ordered on 8/11  -follow up ECHO, EKG as patient has high risk for cardioembolic stroke given arrythmia  -continue high intensity statin therapy\  -care per [facility] at this time.

## 2017-08-11 NOTE — ASSESSMENT & PLAN NOTE
-patient has been off Xarelto for pAF x1-2 weeks after risk benefit discussions.   -hold A/c, antiplatelets in light of recent head imaging concern for bleed  CHADSVASC score 7, HASBLED 3.   -home med rate control and Cardiology consult- patient has recently been on sotalol

## 2017-08-11 NOTE — ASSESSMENT & PLAN NOTE
Functional status: see hospital course  Cognitive/Speech/Language status:  pending    Recommendations  -  Monitor sleep disturbances and establish consistent sleep-wake cycle  ·  Day time- lights on and shades open, night time- lights dim/off  -  Environmental modifications to limit agitation/confusion   · Appropriate lighting, family at bedside, visible clock and calendar, updated white board, reduce noise, limited visitors, clustered nursing care  -  Reorient patient to person, place, time, and situation on each encounter  -  If possible, avoid restraints  -  May benefit from 24/7 supervision by sitter or camera sitter  -  Avoid/limit medications that can worsen delirium (benzodiazepines, antihistamines, anticholinergics, hypnotics, opiates)  -  Encourage mobility, OOB in chair at least 3 hours per day, and early ambulation as appropriate  -  PT/OT evaluate and treat  -  SLP speech and cognitive evaluate and treat  -  Monitor for bowel and bladder dysfunction  -  Monitor for and prevent skin breakdown and pressure ulcers  · Early mobility, repositioning/weight shifting every 20-30 minutes when sitting, turn patient every 2 hours, proper mattress/overlay and chair cushioning, pressure relief/heel protector boots  -  DVT prophylaxis:  SCDs  -  Reviewed discharge options (IP rehab, SNF, HH therapy, and OP therapy)

## 2017-08-11 NOTE — CONSULTS
[facility]  Physical Medicine & Rehab  Consult Note    Patient Name: Sravani Beck  MRN: [id number]  Admission Date: 8/11/2017  Hospital Length of Stay: 0 days  Attending Physician: Lul Patel MD   Collaborating Physician: Eliceo Jaimes MD    Inpatient consult to Physical Medicine & Rehabilitation  Consult requested by:  Lul Patel MD    Performed by: Kaia Abebe NP  Reason for Consult:  assess rehabilitation needs    Consults  Subjective:     Principal Problem: Embolic stroke involving right middle cerebral artery    HPI: Sravani Beck is a 89-year-old female with PMHx of A-fib (on Xarelto until 2 weeks ago), CAD s/p CABG, HTN, HLD, CKD.  Patient presented to [place] after being found on the floor with head trauma, dysarthria and confusion. She is unaware of LOC.  Outside CTH revealed ICH with a small SAH.  Transferred to [facility] on 8/11 for further evaluation and management.  Upon admission, MRI pending.     Functional History: Patient lives in [place] alone in a single story home with no steps to enter.  Prior to admission, independent with ADLs and Courtney with ambulation using a RW at times.  Per the son, she will be living with one of her daughters with sitter upon discharge for 24/7 SV.  DME: RW.      Hospital Course:   8/11/17: Evaluated by therapy.  Bed mobility SV.  Sit to stand CGA and transfers CGA.  UBD SBA and LBD CGA.    AM-PAC 6 CLICK MOBILITY (PT) - Total score: pending   AM-PAC 6 CLICK ADL (OT) - Total score: 18 (8/11)    AM-PAC Raw Score Level of Impairment Assistance   6 100% Total / Unable   7 - 8 80 - 100% Maximal Assist   9-13 60 - 80% Moderate Assist   14 - 19 40 - 60% Moderate Assist   20 - 22 20 - 40% Minimal Assist   23 1-20% SBA / CGA   24 0% Independent/ Mod I     Past Medical History:   Diagnosis Date    AF (paroxysmal atrial fibrillation)     CAD (coronary artery disease) 1970    CABG x 4    HTN (hypertension), benign     Hyperlipidemia LDL goal <70      Past  Surgical History:   Procedure Laterality Date    CARDIOVERSION  2/05 & 10/05    CHOLECYSTECTOMY      CORONARY ARTERY BYPASS GRAFT  1994    x 3    HYSTERECTOMY       Review of patient's allergies indicates:   Allergen Reactions    No known drug allergies        Scheduled Medications:    atorvastatin  40 mg Oral Daily    lisinopril  10 mg Oral Daily    metoprolol tartrate  25 mg Oral BID    sodium chloride 0.9%  3 mL Intravenous Q8H       PRN Medications: acetaminophen, hydrALAZINE, labetalol, ondansetron, sodium chloride 0.9%    Family History     Problem Relation (Age of Onset)    Cerebral aneurysm Mother        Social History Main Topics    Smoking status: Never Smoker    Smokeless tobacco: Never Used    Alcohol use 4.2 oz/week     7 Glasses of wine per week    Drug use: No    Sexual activity: Not on file     Review of Systems   Constitutional: Negative for chills, fatigue and fever.   HENT: Negative for trouble swallowing and voice change.    Eyes: Negative for photophobia and visual disturbance.   Respiratory: Negative for cough, shortness of breath and wheezing.    Cardiovascular: Negative for chest pain and palpitations.   Gastrointestinal: Negative for abdominal distention and nausea.   Genitourinary: Negative for difficulty urinating and flank pain.   Musculoskeletal: Positive for gait problem. Negative for arthralgias.   Skin: Negative for color change and rash.   Neurological: Positive for weakness and headaches. Negative for facial asymmetry, speech difficulty and numbness.   Psychiatric/Behavioral: Negative for agitation and confusion.     Objective:     Vital Signs (Most Recent):  Temp: 97.8 °F (36.6 °C) (08/11/17 0705)  Pulse: 90 (08/11/17 1005)  Resp: 17 (08/11/17 1005)  BP: 116/69 (08/11/17 1005)  SpO2: 98 % (08/11/17 1005)    Vital Signs (24h Range):  Temp:  [97.8 °F (36.6 °C)-98 °F (36.7 °C)] 97.8 °F (36.6 °C)  Pulse:  [] 90  Resp:  [15-24] 17  SpO2:  [98 %-100 %] 98 %  BP:  (115-161)/(56-81) 116/69     Body mass index is 19.35 kg/m².    Physical Exam   Constitutional: She appears well-developed and well-nourished.   HENT:   Head: Normocephalic and atraumatic.   Eyes: EOM are normal. Pupils are equal, round, and reactive to light.   Neck: Normal range of motion.   Cardiovascular: Normal rate and regular rhythm.    Pulmonary/Chest: No respiratory distress.   Abdominal: Soft. There is no tenderness.   Musculoskeletal: Normal range of motion.   Neurological:   -  Mental Status:  AAOx3.  Follows commands.  Answers correct age and .   Recalls 2/3 objects.  No neglect.    -  Speech and language:  + aphasia - dysarthria.    -  Coordination:  Finger to nose exam:  RUE normal, LUE normal.   -  Motor:  RUE: 5/5, 5/5 .  LUE: 5/5, 5/5 .  RLE: 5/5, DF 5/5, PF 5/5.  LLE: 5/5, DF 5/5, PF 5/5.   -  pronator drift. Negative   -  Tone:  normal  -  Sensory:  Intact to light touch and pin prick.   Skin: Skin is warm and dry.   Psychiatric: Cognition and memory are impaired.   Vitals reviewed.      Diagnostic Results: Labs: Reviewed    Assessment/Plan:     SAH (subarachnoid hemorrhage)    Functional status: see hospital course  Cognitive/Speech/Language status:  pending    Recommendations  -  Monitor sleep disturbances and establish consistent sleep-wake cycle  ·  Day time- lights on and shades open, night time- lights dim/off  -  Environmental modifications to limit agitation/confusion   · Appropriate lighting, family at bedside, visible clock and calendar, updated white board, reduce noise, limited visitors, clustered nursing care  -  Reorient patient to person, place, time, and situation on each encounter  -  If possible, avoid restraints  -  May benefit from 24/7 supervision by sitter or camera sitter  -  Avoid/limit medications that can worsen delirium (benzodiazepines, antihistamines, anticholinergics, hypnotics, opiates)  -  Encourage mobility, OOB in chair at least 3 hours per day, and  early ambulation as appropriate  -  PT/OT evaluate and treat  -  SLP speech and cognitive evaluate and treat  -  Monitor for bowel and bladder dysfunction  -  Monitor for and prevent skin breakdown and pressure ulcers  · Early mobility, repositioning/weight shifting every 20-30 minutes when sitting, turn patient every 2 hours, proper mattress/overlay and chair cushioning, pressure relief/heel protector boots  -  DVT prophylaxis:  SCDs  -  Reviewed discharge options (IP rehab, SNF, HH therapy, and OP therapy)                Paroxysmal atrial fibrillation    -on Xarelto up until 1-2 weeks ago         * Embolic stroke involving right middle cerebral artery        Recommendations  -  Encourage mobility, OOB in chair at least 3 hours per day, and early ambulation as appropriate   -  PT/OT evaluate and treat  -  SLP speech and cognitive evaluate and treat  -  Monitor sleep disturbances and establish consistent sleep-wake cycle  -  Monitor for bowel and bladder dysfunction  -  Monitor for shoulder pain and subluxation  -  Monitor for spasticity  -  Monitor for and prevent skin breakdown and pressure ulcers  · Early mobility, repositioning/weight shifting every 20-30 minutes when sitting, turn patient every 2 hours, proper mattress/overlay and chair cushioning, pressure relief/heel protector boots  -  DVT prophylaxis:  SCDs  -  Reviewed discharge options (IP rehab, SNF, HH therapy, and OP therapy)          MRI pending.  Participating with OT.  PT & SLP cog eval pending. Will follow and discuss with rehab team for rehab recommendation.      Thank you for your consult.     Kaia Abebe NP  Department of Physical Medicine & Rehab  [facility]

## 2017-08-11 NOTE — PROGRESS NOTES
Patient arrived to [facility] from [facility]  Type of Stroke SDH    TPA start time and end time NA  Patients current symptoms include No deficits

## 2017-08-11 NOTE — SUBJECTIVE & OBJECTIVE
Past Medical History:   Diagnosis Date    AF (paroxysmal atrial fibrillation)     CAD (coronary artery disease) 1970    CABG x 4    HTN (hypertension), benign     Hyperlipidemia LDL goal <70      Past Surgical History:   Procedure Laterality Date    CARDIOVERSION  2/05 & 10/05    CHOLECYSTECTOMY      CORONARY ARTERY BYPASS GRAFT  1994    x 3    HYSTERECTOMY        No current facility-administered medications on file prior to encounter.      Current Outpatient Prescriptions on File Prior to Encounter   Medication Sig Dispense Refill    glucosamine sulfate 2KCl 1,000 mg Tab Every day      lisinopril 10 MG tablet Take 1 tablet (10 mg total) by mouth once daily. 90 tablet 3    metoprolol tartrate (LOPRESSOR) 25 MG tablet Take 25 mg by mouth 2 (two) times daily.       rivaroxaban (XARELTO) 10 mg Tab Take 10 mg by mouth daily with dinner or evening meal.      simvastatin (ZOCOR) 40 MG tablet Take 1 tablet (40 mg total) by mouth every evening. 90 tablet 0      Allergies: No known drug allergies    Family History   Problem Relation Age of Onset    Cerebral aneurysm Mother     Cancer Neg Hx      Social History   Substance Use Topics    Smoking status: Never Smoker    Smokeless tobacco: Never Used    Alcohol use 4.2 oz/week     7 Glasses of wine per week     Review of Systems   Eyes: Negative for visual disturbance.   Respiratory: Negative for cough and shortness of breath.    Cardiovascular: Negative for chest pain and palpitations.   Gastrointestinal: Negative for constipation, diarrhea, nausea and vomiting.   Genitourinary: Negative for frequency and urgency.   Musculoskeletal: Positive for gait problem (uses cane at home).   Skin: Positive for wound (multiple small head lacs).   Neurological: Positive for syncope and speech difficulty. Negative for weakness and headaches.   Psychiatric/Behavioral: Positive for confusion.     Objective:     Vitals:  Temp: 98 °F (36.7 °C) (08/11/17 0535)  Pulse: 86  (08/11/17 0630)  Resp: 20 (08/11/17 0630)  BP: 138/63 (08/11/17 0630)  SpO2: 98 % (08/11/17 0630)    Temp:  [98 °F (36.7 °C)] 98 °F (36.7 °C)  Pulse:  [73-96] 86  Resp:  [18-24] 20  SpO2:  [98 %-100 %] 98 %  BP: (138-161)/(63-81) 138/63         Resp Rate Total:  [18 br/min] 18 br/min    No intake/output data recorded.    Physical Exam   Constitutional: She appears well-developed and well-nourished. No distress.   HENT:   Head: Normocephalic and atraumatic.   Cardiovascular: Normal rate.    Pulmonary/Chest: Effort normal.   Abdominal: Soft.   Musculoskeletal: Normal range of motion.   Neurological: She is alert. She has normal strength. No sensory deficit.   Oriented to person and hospital  5/5 bilateral elbow flexion and extension, and bilateral hip flexion  Normal tone  Sensation intact to light touch throughout  Normal finger to nose bilaterally   Skin: Skin is warm and dry. She is not diaphoretic.   Small lacs to posterior head, with ecchymosis to L neck   Nursing note and vitals reviewed.    Today I personally reviewed pertinent medications, lines/drains/airways, imaging, cardiology, lab results,

## 2017-08-11 NOTE — ASSESSMENT & PLAN NOTE
Currently rate controlled  Resume home meds  Will need to be on anticoagulation again as outpatient

## 2017-08-11 NOTE — PLAN OF CARE
Problem: Patient Care Overview  Goal: Plan of Care Review  Outcome: Ongoing (interventions implemented as appropriate)  Nutrition assessment completed. Please see RD note details.    Recommendation/Intervention:   1. As medically able, advance diet to Regular with texture per SLP recommendations.   2. If po intake < 50% of meals, add Boost TID to increase caloric intake.      RD to monitor.

## 2017-08-11 NOTE — SUBJECTIVE & OBJECTIVE
Past Medical History:   Diagnosis Date    AF (paroxysmal atrial fibrillation)     CAD (coronary artery disease) 1970    CABG x 4    HTN (hypertension), benign     Hyperlipidemia LDL goal <70      Past Surgical History:   Procedure Laterality Date    CARDIOVERSION  2/05 & 10/05    CHOLECYSTECTOMY      CORONARY ARTERY BYPASS GRAFT  1994    x 3    HYSTERECTOMY       Family History   Problem Relation Age of Onset    Cerebral aneurysm Mother     Cancer Neg Hx      Social History   Substance Use Topics    Smoking status: Never Smoker    Smokeless tobacco: Never Used    Alcohol use 4.2 oz/week     7 Glasses of wine per week     Review of patient's allergies indicates:   Allergen Reactions    No known drug allergies      Medications: I have reviewed the current medication administration record.    Prescriptions Prior to Admission   Medication Sig Dispense Refill Last Dose    glucosamine sulfate 2KCl 1,000 mg Tab Every day   Taking    lisinopril 10 MG tablet Take 1 tablet (10 mg total) by mouth once daily. 90 tablet 3 Taking    metoprolol tartrate (LOPRESSOR) 25 MG tablet Take 25 mg by mouth 2 (two) times daily.    Taking    rivaroxaban (XARELTO) 10 mg Tab Take 10 mg by mouth daily with dinner or evening meal.   Taking    simvastatin (ZOCOR) 40 MG tablet Take 1 tablet (40 mg total) by mouth every evening. 90 tablet 0 Taking       Review of Systems   Eyes: Negative for visual disturbance.   Respiratory: Negative for cough and shortness of breath.    Cardiovascular: Negative for chest pain and palpitations.   Gastrointestinal: Negative for constipation, diarrhea, nausea and vomiting.   Genitourinary: Negative for frequency and urgency.   Musculoskeletal: Positive for gait problem.   Skin: Positive for wound (multiple small head lacs).   Neurological: Positive for syncope and speech difficulty. Negative for weakness and headaches.   Psychiatric/Behavioral: Positive for confusion.     Objective:     Vital  Signs (Most Recent):  Temp: 98 °F (36.7 °C) (08/11/17 0535)  Pulse: 86 (08/11/17 0630)  Resp: 20 (08/11/17 0630)  BP: 138/63 (08/11/17 0630)  SpO2: 98 % (08/11/17 0630)    Vital Signs Range (Last 24H):  Temp:  [98 °F (36.7 °C)]   Pulse:  [73-96]   Resp:  [18-24]   BP: (138-161)/(63-81)   SpO2:  [98 %-100 %]     Physical Exam   Constitutional: She appears well-developed and well-nourished. No distress.   HENT:   Head: Normocephalic and atraumatic.   Left Ear: External ear normal.   Mouth/Throat: Oropharynx is clear and moist.   Eyes: Conjunctivae are normal. Pupils are equal, round, and reactive to light. Left eye exhibits no discharge. No scleral icterus.   Neck: No tracheal deviation present. No thyromegaly present.   Cardiovascular: Normal rate and normal heart sounds.  Exam reveals no gallop and no friction rub.    No murmur heard.  Pulmonary/Chest: Effort normal.   Abdominal: Soft. She exhibits no distension. There is no tenderness.   Musculoskeletal: Normal range of motion.   Neurological: She is alert. She has normal strength and normal reflexes. She displays normal reflexes. No cranial nerve deficit or sensory deficit. Coordination abnormal.   Skin: Skin is warm and dry. She is not diaphoretic.   Small lacs to posterior head, with ecchymosis to L neck   Nursing note and vitals reviewed.      Neurological Exam:   Oriented to person and hospital  5/5 bilateral elbow flexion and extension, and bilateral hip flexion  Normal tone  Sensation intact to light touch throughout  Normal finger to nose bilaterally    Stroke Scales  Laboratory:  CMP: No results for input(s): GLUCOSE, CALCIUM, ALBUMIN, PROT, NA, K, CO2, CL, BUN, CREATININE, ALKPHOS, ALT, AST, BILITOT in the last 24 hours.  BMP: No results for input(s): GLUCOSE, NA, K, CL, CO2, BUN, CREATININE, CALCIUM in the last 168 hours.  CBC: No results for input(s): WBC, RBC, HGB, HCT, PLT, MCV, MCH, MCHC in the last 168 hours.  Lipid Panel: No results for input(s):  CHOL, LDLCALC, HDL, TRIG in the last 168 hours.  Coagulation: No results for input(s): INR, APTT in the last 168 hours.    Invalid input(s): PT  Platelet Aggregation Study: No results for input(s): PLTAGG, PLTAGINTERP, PLTAGREGLACO, ADPPLTAGGREG in the last 168 hours.  Hgb A1C: No results for input(s): HGBA1C in the last 168 hours.  TSH: No results for input(s): TSH in the last 168 hours.    Diagnostic Results:  Brain Imaging: CT Head. Date: 8/10/11 Reviewed; shows small SAH  Awaiting MRI brain.       Cardiac Evaluation: atrial fib with RVR on previous EKGs.

## 2017-08-11 NOTE — HPI
Ms. Beck is a 89 yo F with pAF previously on Xarelto (discontinued 2 weeks prior to admission after fall risk assessed by outside physician, on sotalol), CAD s/p CABG on daily [id number], HTN, CKD, HPLD was admitted to [facility] on 8/10/17 with ~20 minutes of dysarthria and confusion that began at 10am and CT head after a fall showed an intraparenchymal hemorrhage. Patient also reportedly had a recurrence of confusion/dyarthria intermittenly in the few hours after presenting to the OSH. Patient has been intermittently symptomatic and stroke workup was performed at [facility]. Last night, she was found down on the floor, unwitnessed, +LOC with 3 minor scalp lacerations, and patient was AOx1 at the time of the event. Repeat CT head showed ICH with a potential small SAH and she was transferred to Neurocritical care unit at [facility] for higher level of care and Vascular Neuro consultation.

## 2017-08-11 NOTE — PLAN OF CARE
Problem: Physical Therapy Goal  Goal: Physical Therapy Goal  Goals to be met by: 2017     Patient will increase functional independence with mobility by performin. Sit to stand transfer with Stand-by Assistance  2. Bed to chair transfer with Stand-by Assistance  3. Gait  x 150 feet with Stand-by Assistance using RW or Rollator walker.   4. Pt will ascend/descend curb step with SBA and verbal cues using a RW to enter/exit home environment.   5. Stand for 10 minutes with Stand-by Assistance while performing UE tasks to enable safe participation in self care      Outcome: Ongoing (interventions implemented as appropriate)  Initial eval completed.  Results, POC and goals discussed with patient and family.

## 2017-08-11 NOTE — HPI
Sravani Beck is a 89-year-old female with PMHx of A-fib (on Xarelto until 2 weeks ago), CAD s/p CABG, HTN, HLD, CKD.  Patient presented to [place] after being found on the floor with head trauma, dysarthria and confusion. She is unaware of LOC.  Outside CTH revealed ICH with a small SAH.  Transferred to [facility] on 8/11 for further evaluation and management.  Upon admission, MRI pending.     Functional History: Patient lives in [place] alone in a single story home with no steps to enter.  Prior to admission, independent with ADLs and Courtney with ambulation using a RW at times.  Per the son, she will be living with one of her daughters with sitter upon discharge for 24/7 SV.  DME: [facility].

## 2017-08-11 NOTE — PLAN OF CARE
[facility] - [place], [place] - [address]  [address] [address]  Phone: [phone] Fax: [phone]    [facility] [place], [place] - [address]  [address]  [place] [address]  Phone: [phone] Fax: [phone]      This CM spoke with patient and daughter at bedside.       08/11/17 1223   Discharge Assessment   Assessment Type Discharge Planning Assessment   Confirmed/corrected address and phone number on facesheet? Yes   Assessment information obtained from? Caregiver;Patient   Expected Length of Stay (days) 3   Communicated expected length of stay with patient/caregiver yes   Prior to hospitilization cognitive status: Alert/Oriented   Prior to hospitalization functional status: Independent   Current cognitive status: Alert/Oriented   Current Functional Status: Independent   Arrived From acute care hospital  ([facility])   Lives With alone   Able to Return to Prior Arrangements yes   Is patient able to care for self after discharge? Unable to determine at this time (comments)   How many people do you have in your home that can help with your care after discharge? 1   Who are your caregiver(s) and their phone number(s)? (Nidia Coleman daughter [phone])   Patient's perception of discharge disposition home or selfcare   Readmission Within The Last 30 Days no previous admission in last 30 days   Patient currently being followed by outpatient case management? No   Patient currently receives home health services? No   Does the patient currently use HME? No   Patient currently receives private duty nursing? No   Patient currently receives any other outside agency services? No   Equipment Currently Used at Home none   Do you have any problems affording any of your prescribed medications? No   Is the patient taking medications as prescribed? yes   Do you have any financial concerns preventing you from receiving the healthcare  you need? No   Does the patient have transportation to healthcare appointments? Yes   Transportation Available family or friend will provide   On Dialysis? No   Does the patient receive services at the Coumadin Clinic? No   Are there any open cases? No   Discharge Plan A Home;Home Health   Discharge Plan B Home with family   Patient/Family In Agreement With Plan yes       Carmen Gómez CM  [id number]

## 2017-08-11 NOTE — ASSESSMENT & PLAN NOTE
Recently off xarelto  Likely cardioembolic  Will need anticoagulation as outpatient  Stroke workup  Transfer to vascular neurology service

## 2017-08-11 NOTE — SUBJECTIVE & OBJECTIVE
Past Medical History:   Diagnosis Date    AF (paroxysmal atrial fibrillation)     CAD (coronary artery disease) 1970    CABG x 4    HTN (hypertension), benign     Hyperlipidemia LDL goal <70      Past Surgical History:   Procedure Laterality Date    CARDIOVERSION  2/05 & 10/05    CHOLECYSTECTOMY      CORONARY ARTERY BYPASS GRAFT  1994    x 3    HYSTERECTOMY       Review of patient's allergies indicates:   Allergen Reactions    No known drug allergies        Scheduled Medications:    atorvastatin  40 mg Oral Daily    lisinopril  10 mg Oral Daily    metoprolol tartrate  25 mg Oral BID    sodium chloride 0.9%  3 mL Intravenous Q8H       PRN Medications: acetaminophen, hydrALAZINE, labetalol, ondansetron, sodium chloride 0.9%    Family History     Problem Relation (Age of Onset)    Cerebral aneurysm Mother        Social History Main Topics    Smoking status: Never Smoker    Smokeless tobacco: Never Used    Alcohol use 4.2 oz/week     7 Glasses of wine per week    Drug use: No    Sexual activity: Not on file     Review of Systems   Constitutional: Negative for chills, fatigue and fever.   HENT: Negative for trouble swallowing and voice change.    Eyes: Negative for photophobia and visual disturbance.   Respiratory: Negative for cough, shortness of breath and wheezing.    Cardiovascular: Negative for chest pain and palpitations.   Gastrointestinal: Negative for abdominal distention and nausea.   Genitourinary: Negative for difficulty urinating and flank pain.   Musculoskeletal: Positive for gait problem. Negative for arthralgias.   Skin: Negative for color change and rash.   Neurological: Positive for weakness and headaches. Negative for facial asymmetry, speech difficulty and numbness.   Psychiatric/Behavioral: Negative for agitation and confusion.     Objective:     Vital Signs (Most Recent):  Temp: 97.8 °F (36.6 °C) (08/11/17 0705)  Pulse: 90 (08/11/17 1005)  Resp: 17 (08/11/17 1005)  BP: 116/69  (17 1005)  SpO2: 98 % (17 1005)    Vital Signs (24h Range):  Temp:  [97.8 °F (36.6 °C)-98 °F (36.7 °C)] 97.8 °F (36.6 °C)  Pulse:  [] 90  Resp:  [15-24] 17  SpO2:  [98 %-100 %] 98 %  BP: (115-161)/(56-81) 116/69     Body mass index is 19.35 kg/m².    Physical Exam   Constitutional: She appears well-developed and well-nourished.   HENT:   Head: Normocephalic and atraumatic.   Eyes: EOM are normal. Pupils are equal, round, and reactive to light.   Neck: Normal range of motion.   Cardiovascular: Normal rate and regular rhythm.    Pulmonary/Chest: No respiratory distress.   Abdominal: Soft. There is no tenderness.   Musculoskeletal: Normal range of motion.   Neurological:   -  Mental Status:  AAOx3.  Follows commands.  Answers correct age and .  Recent and remote memory intact.  Recalls 2/3 objects.  No neglect.    -  Speech and language:  + aphasia - dysarthria.    -  Coordination:  Finger to nose exam:  RUE normal, LUE normal.   -  Motor:  RUE: 5/5, 5/5 .  LUE: 5/5, 5/5 .  RLE: 5/5, DF 5/5, PF 5/5.  LLE: 5/5, DF 5/5, PF 5/5.   -  pronator drift. Negative   -  Tone:  normal  -  Sensory:  Intact to light touch and pin prick.   Skin: Skin is warm and dry.   Psychiatric: Cognition and memory are impaired.   Vitals reviewed.    NEUROLOGICAL EXAMINATION:     CRANIAL NERVES     CN III, IV, VI   Pupils are equal, round, and reactive to light.  Extraocular motions are normal.       Diagnostic Results: Labs: Reviewed

## 2017-08-11 NOTE — PROGRESS NOTES
Patient arrived to [facility] from [facility] by [facility]. Cardiac monitor and continuous pulse oximetry applied. ANETA Kunz notified of patient's arrival. Will continue to monitor.

## 2017-08-11 NOTE — ASSESSMENT & PLAN NOTE
AIS with two punctate lesions noted (on my personal review of imaging).  Of note, known afib, off AC for 1-2 weeks  Started 8/10, with intermittent symptoms of dysarthria and confusion  TTE as below  Hold ASA for now, given concern for bleed  Atorvastatin 40mg daily  Repeat MRI Brain today, with additional MRA Brain   Carotid US 8/10 at OSH negative for hemodynamically significant stenosis or occlusion  Vascular neurology consulted.    PT/OT  SBP<180 from AIS standpoint, given that she is one day out; may consider lowering BP parameters if blood found on MRI

## 2017-08-11 NOTE — HOSPITAL COURSE
89 yo previously on Xarelto presents with SAH  8/12: Discussion with POA, daughter Nena Coleman, and Dr. Melchor was to resume anticoagulation for Afib today. Patient to go home with Home health.

## 2017-08-11 NOTE — ASSESSMENT & PLAN NOTE
Recommendations  -  Encourage mobility, OOB in chair at least 3 hours per day, and early ambulation as appropriate   -  PT/OT evaluate and treat  -  SLP speech and cognitive evaluate and treat  -  Monitor sleep disturbances and establish consistent sleep-wake cycle  -  Monitor for bowel and bladder dysfunction  -  Monitor for shoulder pain and subluxation  -  Monitor for spasticity  -  Monitor for and prevent skin breakdown and pressure ulcers  · Early mobility, repositioning/weight shifting every 20-30 minutes when sitting, turn patient every 2 hours, proper mattress/overlay and chair cushioning, pressure relief/heel protector boots  -  DVT prophylaxis:  SCDs  -  Reviewed discharge options (IP rehab, SNF, HH therapy, and OP therapy)

## 2017-08-11 NOTE — CONSULTS
"  [facility]  Adult Nutrition  Consult Note    SUMMARY     Recommendations    Recommendation/Intervention:   1. As medically able, advance diet to Regular with texture per SLP recommendations.   2. If po intake < 50% of meals, add Boost TID to increase caloric intake.     RD to monitor.      Goals: Pt to receive nutrition by RD follow up  Nutrition Goal Status: new  Communication of RD Recs: reviewed with RN    Reason for Assessment    Reason for Assessment: physician consult  Diagnosis: other (see comments) (SDH)  Relevent Medical History: CAD, CABG, HTN, HLD, CKD         General Information Comments: Pt with son at bedside. Remains NPO. Passed MILADYS.    Nutrition Discharge Planning: adequate po intake for optimal nutrition.    Nutrition Prescription Ordered    Current Diet Order: NPO     Evaluation of Received Nutrients/Fluid Intake     % Intake of Estimated Energy Needs: 0 - 25 %  % Meal Intake: NPO     Nutrition Risk Screen     Nutrition Risk Screen: no indicators present    Nutrition/Diet History       Typical Food/Fluid Intake: Pt reports no recent weight loss. Asking for meal, pt reports being hungry.   Food Preferences: No Judaism/cultural preferences identified at this time.        Factors Affecting Nutritional Intake: NPO                Labs/Tests/Procedures/Meds       Pertinent Labs Reviewed: reviewed     Pertinent Medications Reviewed: reviewed  Pertinent Medications Comments: statin    Physical Findings    Overall Physical Appearance: nourished        Skin: other (see comments) (lacerations)    Anthropometrics    Temp: 97.8 °F (36.6 °C)     Height: 5' 2" (157.5 cm)  Weight Method: Bed Scale  Weight: 48 kg (105 lb 13.1 oz)     Ideal Body Weight (IBW), Female: 110 lb     % Ideal Body Weight, Female (lb): 96.2 lb  BMI (Calculated): 19.4  BMI Grade: 18.5-24.9 - normal                            Estimated/Assessed Needs    Weight Used For Calorie Calculations: 48 kg (105 lb 13.1 oz)    "   Energy Calorie Requirements (kcal): 1047  Energy Need Method: Randolph-St Jeor (PAL 1.25)        RMR (Randolph-St. Jeor Equation): 838.25        Weight Used For Protein Calculations: 48 kg (105 lb 13.1 oz)  Protein Requirements: 48-58g (1.0-1.2g/kg)    Fluid Requirements (mL): 1ml/kcal or per MD  RDA Method (mL): 1047               Assessment and Plan    Nutrition Problem  Inadequate energy intake    Related to (etiology):   Inability to consume sufficient energy    Signs and Symptoms (as evidenced by):   NPO, no alternative means of nutrition.     Interventi ons/Recommendations (treatment strategy):  See RD recs above.    Nutrition Diagnosis Status:   New        Monitor and Evaluation    Food and Nutrient Intake: energy intake, food and beverage intake  Food and Nutrient Adminstration: diet order        Anthropometric Measurements: weight, weight change, body mass index  Biochemical Data, Medical Tests and Procedures: electrolyte and renal panel, gastrointestinal profile, glucose/endocrine profile, inflammatory profile, lipid profile  Nutrition-Focused Physical Findings: overall appearance    Nutrition Risk    Level of Risk: other (see comments) (f/u 2x/week)    Nutrition Follow-Up    RD Follow-up?: Yes

## 2017-08-11 NOTE — H&P
[facility]  Neurocritical Care  History & Physical    Admit Date: 8/11/2017  Service Date: 08/11/2017  Length of Stay: 0    Subjective:     Chief Complaint: Small vessel stroke    History of Present Illness: Ms. Beck is a 89 yo F with a h/o afib (Xarelto until 2 weeks ago; sotalol), CAD s/p CABG (on ASA 325mg daily), HTN, HLD, CKD who was admitted to [place] 8/10/17 with dysarthria and confusion, which started at 10am, and then was transferred after a CTH s/p fall showed IPH.  Initially, she developed dysarthria and confusion at 10am for approximately twenty minutes, and then her symptoms resolved.  However, her symptoms waxed and waned, and she had several symptomatic episodes over the next few hours.  She was admitted to [facility], and a stroke workup was started.  However, around 1:30am, she was found on the floor with three scalp lacerations - none of which required suturing.  It was unclear how, or under what circumstances, she had fallen.  She was found to be oriented to name and the fact that she was in a hospital, which was reportedly her baseline.  She was taken for a CTH, which reportedly showed ICH with small SAH (minimal to no blood seen on my review).  She was transferred to [facility] for a higher level of care.      Past Medical History:   Diagnosis Date    AF (paroxysmal atrial fibrillation)     CAD (coronary artery disease) 1970    CABG x 4    HTN (hypertension), benign     Hyperlipidemia LDL goal <70      Past Surgical History:   Procedure Laterality Date    CARDIOVERSION  2/05 & 10/05    CHOLECYSTECTOMY      CORONARY ARTERY BYPASS GRAFT  1994    x 3    HYSTERECTOMY        No current facility-administered medications on file prior to encounter.      Current Outpatient Prescriptions on File Prior to Encounter   Medication Sig Dispense Refill    glucosamine sulfate 2KCl 1,000 mg Tab Every day      lisinopril 10 MG tablet Take 1 tablet (10 mg total) by mouth once daily. 90  tablet 3    metoprolol tartrate (LOPRESSOR) 25 MG tablet Take 25 mg by mouth 2 (two) times daily.       rivaroxaban (XARELTO) 10 mg Tab Take 10 mg by mouth daily with dinner or evening meal.      simvastatin (ZOCOR) 40 MG tablet Take 1 tablet (40 mg total) by mouth every evening. 90 tablet 0      Allergies: No known drug allergies    Family History   Problem Relation Age of Onset    Cerebral aneurysm Mother     Cancer Neg Hx      Social History   Substance Use Topics    Smoking status: Never Smoker    Smokeless tobacco: Never Used    Alcohol use 4.2 oz/week     7 Glasses of wine per week     Review of Systems   Eyes: Negative for visual disturbance.   Respiratory: Negative for cough and shortness of breath.    Cardiovascular: Negative for chest pain and palpitations.   Gastrointestinal: Negative for constipation, diarrhea, nausea and vomiting.   Genitourinary: Negative for frequency and urgency.   Musculoskeletal: Positive for gait problem (uses cane at home).   Skin: Positive for wound (multiple small head lacs).   Neurological: Positive for syncope and speech difficulty. Negative for weakness and headaches.   Psychiatric/Behavioral: Positive for confusion.     Objective:     Vitals:  Temp: 98 °F (36.7 °C) (08/11/17 0535)  Pulse: 86 (08/11/17 0630)  Resp: 20 (08/11/17 0630)  BP: 138/63 (08/11/17 0630)  SpO2: 98 % (08/11/17 0630)    Temp:  [98 °F (36.7 °C)] 98 °F (36.7 °C)  Pulse:  [73-96] 86  Resp:  [18-24] 20  SpO2:  [98 %-100 %] 98 %  BP: (138-161)/(63-81) 138/63         Resp Rate Total:  [18 br/min] 18 br/min    No intake/output data recorded.    Physical Exam   Constitutional: She appears well-developed and well-nourished. No distress.   HENT:   Head: Normocephalic and atraumatic.   Cardiovascular: Normal rate.    Pulmonary/Chest: Effort normal.   Abdominal: Soft.   Musculoskeletal: Normal range of motion.   Neurological: She is alert. She has normal strength. No sensory deficit.   Oriented to person  and hospital  5/5 bilateral elbow flexion and extension, and bilateral hip flexion  Normal tone  Sensation intact to light touch throughout  Normal finger to nose bilaterally   Skin: Skin is warm and dry. She is not diaphoretic.   Small lacs to posterior head, with ecchymosis to L neck   Nursing note and vitals reviewed.    Today I personally reviewed pertinent medications, lines/drains/airways, imaging, cardiology, lab results,    Assessment/Plan:     Neuro   * Embolic stroke involving right middle cerebral artery    AIS with two punctate lesions noted (on my personal review of imaging).  Of note, known afib, off AC for 1-2 weeks  Started 8/10, with intermittent symptoms of dysarthria and confusion  TTE as below  Hold ASA for now, given concern for bleed  Atorvastatin 40mg daily  Repeat MRI Brain today, with additional MRA Brain   Carotid US 8/10 at OSH negative for hemodynamically significant stenosis or occlusion  Vascular neurology consulted.    PT/OT  SBP<180 from AIS standpoint, given that she is one day out; may consider lowering BP parameters if blood found on MRI        SAH (subarachnoid hemorrhage)    Per history; not well-visualized on imaging.  If present, presumed traumatic.  Repeat MRI Brain        Cardiac/Vascular   Paroxysmal atrial fibrillation    Xarelto reportedly DCd 1-2 weeks ago by outpatient cardiologist.  Hold for now  Metoprolol for rate control; currently rate controlled  Reportedly on sotalol at home.  Clarify with patient's daughter when she arrives.  If unclear reasoning for sotalol, consider consulting cardiology before restarting  EKG  TTE 8/10 at OSH shows mod LAE with normal EF and normal diastolic dysfunction        Hypertension, benign    Continue home metoprolol, lisinopril        CAD (coronary artery disease)    Hold ASA 325mg in the setting of possible ICH/SAH        Orthopedic   Fall    EEG, as patient does not recall event, and in fact, does not remember being on the floor at  all  Consider orthostatics            Prophylaxis:  Venous Thromboembolism: mechanical  Stress Ulcer: NA  Ventilator Pneumonia: not applicable     Activity Orders          None        Full Code    Teri Angel MD  Neurocritical Care  [facility]

## 2017-08-11 NOTE — HPI
Ms. Beck is a 89 yo F with a h/o afib (Xarelto until 2 weeks ago; sotalol), CAD s/p CABG (on ASA 325mg daily), HTN, HLD, CKD who was admitted to [place] 8/10/17 with dysarthria and confusion, which started at 10am, and then was transferred after a CTH s/p fall showed IPH.  Initially, she developed dysarthria and confusion at 10am for approximately twenty minutes, and then her symptoms resolved.  However, her symptoms waxed and waned, and she had several symptomatic episodes over the next few hours.  She was admitted to [place], and a stroke workup was started.  However, around 1:30am, she was found on the floor with three scalp lacerations - none of which required suturing.  It was unclear how, or under what circumstances, she had fallen.  She was found to be oriented to name and the fact that she was in a hospital, which was reportedly her baseline.  She was taken for a CTH, which reportedly showed ICH with small SAH (minimal to no blood seen on my review).  She was transferred to [facility] for a higher level of care.

## 2017-08-12 VITALS
DIASTOLIC BLOOD PRESSURE: 81 MMHG | RESPIRATION RATE: 17 BRPM | OXYGEN SATURATION: 97 % | TEMPERATURE: 98 F | HEART RATE: 54 BPM | BODY MASS INDEX: 19.47 KG/M2 | HEIGHT: 62 IN | WEIGHT: 105.81 LBS | SYSTOLIC BLOOD PRESSURE: 96 MMHG

## 2017-08-12 PROBLEM — I60.9 SAH (SUBARACHNOID HEMORRHAGE): Status: RESOLVED | Noted: 2017-08-11 | Resolved: 2017-08-12

## 2017-08-12 LAB
ALBUMIN SERPL BCP-MCNC: 3.4 G/DL
ALP SERPL-CCNC: 59 U/L
ALT SERPL W/O P-5'-P-CCNC: 13 U/L
ANION GAP SERPL CALC-SCNC: 6 MMOL/L
AST SERPL-CCNC: 25 U/L
BASOPHILS # BLD AUTO: 0.01 K/UL
BASOPHILS NFR BLD: 0.1 %
BILIRUB SERPL-MCNC: 0.9 MG/DL
BUN SERPL-MCNC: 12 MG/DL
CALCIUM SERPL-MCNC: 9.4 MG/DL
CHLORIDE SERPL-SCNC: 102 MMOL/L
CO2 SERPL-SCNC: 27 MMOL/L
CREAT SERPL-MCNC: 0.8 MG/DL
DIFFERENTIAL METHOD: ABNORMAL
EOSINOPHIL # BLD AUTO: 0.3 K/UL
EOSINOPHIL NFR BLD: 3.7 %
ERYTHROCYTE [DISTWIDTH] IN BLOOD BY AUTOMATED COUNT: 13.8 %
EST. GFR  (AFRICAN AMERICAN): >60 ML/MIN/1.73 M^2
EST. GFR  (NON AFRICAN AMERICAN): >60 ML/MIN/1.73 M^2
GLUCOSE SERPL-MCNC: 100 MG/DL
HCT VFR BLD AUTO: 31.9 %
HGB BLD-MCNC: 10.6 G/DL
LYMPHOCYTES # BLD AUTO: 2.5 K/UL
LYMPHOCYTES NFR BLD: 31.8 %
MAGNESIUM SERPL-MCNC: 1.6 MG/DL
MCH RBC QN AUTO: 31.4 PG
MCHC RBC AUTO-ENTMCNC: 33.2 G/DL
MCV RBC AUTO: 94 FL
MONOCYTES # BLD AUTO: 1.3 K/UL
MONOCYTES NFR BLD: 16.6 %
NEUTROPHILS # BLD AUTO: 3.7 K/UL
NEUTROPHILS NFR BLD: 47.4 %
PHOSPHATE SERPL-MCNC: 3 MG/DL
PLATELET # BLD AUTO: 179 K/UL
PMV BLD AUTO: 12 FL
POCT GLUCOSE: 119 MG/DL (ref 70–110)
POTASSIUM SERPL-SCNC: 4.1 MMOL/L
PROT SERPL-MCNC: 6.5 G/DL
RBC # BLD AUTO: 3.38 M/UL
SODIUM SERPL-SCNC: 135 MMOL/L
WBC # BLD AUTO: 7.85 K/UL

## 2017-08-12 PROCEDURE — A4216 STERILE WATER/SALINE, 10 ML: HCPCS | Performed by: STUDENT IN AN ORGANIZED HEALTH CARE EDUCATION/TRAINING PROGRAM

## 2017-08-12 PROCEDURE — 93005 ELECTROCARDIOGRAM TRACING: CPT

## 2017-08-12 PROCEDURE — 99239 HOSP IP/OBS DSCHRG MGMT >30: CPT | Mod: GC,,, | Performed by: PSYCHIATRY & NEUROLOGY

## 2017-08-12 PROCEDURE — 97530 THERAPEUTIC ACTIVITIES: CPT

## 2017-08-12 PROCEDURE — 83735 ASSAY OF MAGNESIUM: CPT

## 2017-08-12 PROCEDURE — 84100 ASSAY OF PHOSPHORUS: CPT

## 2017-08-12 PROCEDURE — 80053 COMPREHEN METABOLIC PANEL: CPT

## 2017-08-12 PROCEDURE — 36415 COLL VENOUS BLD VENIPUNCTURE: CPT

## 2017-08-12 PROCEDURE — 25000003 PHARM REV CODE 250: Performed by: INTERNAL MEDICINE

## 2017-08-12 PROCEDURE — 85025 COMPLETE CBC W/AUTO DIFF WBC: CPT

## 2017-08-12 PROCEDURE — 25000003 PHARM REV CODE 250: Performed by: STUDENT IN AN ORGANIZED HEALTH CARE EDUCATION/TRAINING PROGRAM

## 2017-08-12 PROCEDURE — 63600175 PHARM REV CODE 636 W HCPCS: Performed by: PHYSICIAN ASSISTANT

## 2017-08-12 PROCEDURE — 25000003 PHARM REV CODE 250: Performed by: NURSE PRACTITIONER

## 2017-08-12 PROCEDURE — 63600175 PHARM REV CODE 636 W HCPCS: Performed by: INTERNAL MEDICINE

## 2017-08-12 PROCEDURE — 93010 ELECTROCARDIOGRAM REPORT: CPT | Mod: ,,, | Performed by: INTERNAL MEDICINE

## 2017-08-12 RX ORDER — MAGNESIUM SULFATE HEPTAHYDRATE 40 MG/ML
2 INJECTION, SOLUTION INTRAVENOUS ONCE
Status: COMPLETED | OUTPATIENT
Start: 2017-08-12 | End: 2017-08-12

## 2017-08-12 RX ORDER — QUETIAPINE FUMARATE 25 MG/1
25 TABLET, FILM COATED ORAL ONCE
Status: COMPLETED | OUTPATIENT
Start: 2017-08-12 | End: 2017-08-12

## 2017-08-12 RX ORDER — DABIGATRAN ETEXILATE 150 MG/1
150 CAPSULE ORAL 2 TIMES DAILY
Qty: 60 CAPSULE | Refills: 3 | Status: SHIPPED | OUTPATIENT
Start: 2017-08-12 | End: 2017-08-12

## 2017-08-12 RX ORDER — NAPROXEN SODIUM 220 MG/1
81 TABLET, FILM COATED ORAL DAILY
Qty: 60 TABLET | Refills: 3 | COMMUNITY
Start: 2017-08-12 | End: 2020-01-01

## 2017-08-12 RX ORDER — DABIGATRAN ETEXILATE 150 MG/1
150 CAPSULE ORAL 2 TIMES DAILY
Status: DISCONTINUED | OUTPATIENT
Start: 2017-08-12 | End: 2017-08-12 | Stop reason: HOSPADM

## 2017-08-12 RX ORDER — ATORVASTATIN CALCIUM 40 MG/1
40 TABLET, FILM COATED ORAL DAILY
Qty: 60 TABLET | Refills: 3 | Status: SHIPPED | OUTPATIENT
Start: 2017-08-12 | End: 2017-08-12

## 2017-08-12 RX ORDER — MAGNESIUM SULFATE HEPTAHYDRATE 40 MG/ML
2 INJECTION, SOLUTION INTRAVENOUS ONCE
Status: DISCONTINUED | OUTPATIENT
Start: 2017-08-12 | End: 2017-08-12

## 2017-08-12 RX ORDER — ATORVASTATIN CALCIUM 40 MG/1
40 TABLET, FILM COATED ORAL DAILY
Qty: 60 TABLET | Refills: 3 | Status: SHIPPED | OUTPATIENT
Start: 2017-08-12 | End: 2020-01-01 | Stop reason: DRUGHIGH

## 2017-08-12 RX ORDER — DABIGATRAN ETEXILATE 150 MG/1
150 CAPSULE ORAL 2 TIMES DAILY
Qty: 60 CAPSULE | Refills: 3 | Status: SHIPPED | OUTPATIENT
Start: 2017-08-12 | End: 2020-01-01 | Stop reason: ALTCHOICE

## 2017-08-12 RX ORDER — NAPROXEN SODIUM 220 MG/1
81 TABLET, FILM COATED ORAL DAILY
Status: DISCONTINUED | OUTPATIENT
Start: 2017-08-12 | End: 2017-08-12 | Stop reason: HOSPADM

## 2017-08-12 RX ADMIN — MAGNESIUM SULFATE IN WATER 2 G: 40 INJECTION, SOLUTION INTRAVENOUS at 09:08

## 2017-08-12 RX ADMIN — METOPROLOL TARTRATE 25 MG: 25 TABLET ORAL at 09:08

## 2017-08-12 RX ADMIN — ASPIRIN 325 MG ORAL TABLET 325 MG: 325 PILL ORAL at 09:08

## 2017-08-12 RX ADMIN — DABIGATRAN ETEXILATE MESYLATE 150 MG: 150 CAPSULE ORAL at 12:08

## 2017-08-12 RX ADMIN — LISINOPRIL 10 MG: 10 TABLET ORAL at 09:08

## 2017-08-12 RX ADMIN — MIDAZOLAM HYDROCHLORIDE 1 MG: 1 INJECTION, SOLUTION INTRAMUSCULAR; INTRAVENOUS at 01:08

## 2017-08-12 RX ADMIN — Medication 3 ML: at 06:08

## 2017-08-12 RX ADMIN — ATORVASTATIN CALCIUM 40 MG: 20 TABLET, FILM COATED ORAL at 09:08

## 2017-08-12 RX ADMIN — QUETIAPINE FUMARATE 25 MG: 25 TABLET, FILM COATED ORAL at 02:08

## 2017-08-12 RX ADMIN — Medication 3 ML: at 01:08

## 2017-08-12 NOTE — ASSESSMENT & PLAN NOTE
-patient has been off Xarelto for pAF x2 weeks; will resume Pradaxa based on extensive risk:benefit counseling withPOA  CHADSVASC score 7, HASBLED 3.   -home med rate control, EKG confirmed with pAF this admission

## 2017-08-12 NOTE — PT/OT/SLP PROGRESS
"Physical Therapy  Treatment/Discharge Summary    Sravani Beck   MRN: [id number]   Admitting Diagnosis: Embolic stroke involving right middle cerebral artery    PT Received On: 08/12/17  PT Start Time: 1106     PT Stop Time: 1119    PT Total Time (min): 13 min       Billable Minutes:  Therapeutic Activity  10    Treatment Type: Treatment  PT/PTA: PT       General Precautions: Standard, aspiration, fall    Subjective:  Communicated with RN (Rosanna) prior to session.    "I am much better than before. I have been walking to the bathroom with no problem. Are you letting me go home?"    Pain/Comfort  Pain Rating 1: 0/10  Pain Rating Post-Intervention 1: 0/10    Objective:   Patient found with: telemetry, peripheral IV    Functional Mobility:  Bed Mobility: Pt found and remained UIC..    Transfers:  Sit <> Stand Assistance: Supervision  Sit <> Stand Assistive Device: No Assistive Device  Bed <> Chair Technique: Stand Pivot  Bed <> Chair Assistance: Supervision  Bed <> Chair Assistive Device: No Assistive Device    Gait:   Gait Distance: Pt ambulated x200 feet with RW in hallway setting; pt req sup-SBA only for directional guidance. No LOB or overt instability noted.  Assistance 1: Supervision, Stand by Assistance  Gait Assistive Device: Rolling walker  Gait Pattern: reciprocal  Gait Deviation(s): decreased ravi, decreased weight-shifting ability (pt and pt's daughter report baseline)    Balance:   Static Sit: FAIR+: Able to take MINIMAL challenges from all directions  Dynamic Sit: GOOD-: Maintains balance through MODERATE excursions of active trunk movement,     Static Stand: FAIR+: Takes MINIMAL challenges from all directions  Dynamic stand: FAIR+: Needs CLOSE SUPERVISION during gait and is able to right self with minor LOB using RW    Therapeutic Activities and Exercises:  PT arrived to pt's room to find pt resting quietly; agreeable to PT session. Pt performed mobility as above. Upon return to sitting [facility], PT reviewed " recommendations for HH and expectations of HH services, mobility needs, and progressive return to unsupervised activity. Questions/concerns addressed within PT scope of practice. RN aware of pt's status and mobility needs.    AM-PAC 6 CLICK MOBILITY  How much help from another person does this patient currently need?   1 = Unable, Total/Dependent Assistance  2 = A lot, Maximum/Moderate Assistance  3 = A little, Minimum/Contact Guard/Supervision  4 = None, Modified [place]/Independent    Turning over in bed (including adjusting bedclothes, sheets and blankets)?: 4  Sitting down on and standing up from a chair with arms (e.g., wheelchair, bedside commode, etc.): 4  Moving from lying on back to sitting on the side of the bed?: 4  Moving to and from a bed to a chair (including a wheelchair)?: 4  Need to walk in hospital room?: 4  Climbing 3-5 steps with a railing?: 3  Total Score: 23    AM-PAC Raw Score CMS G-Code Modifier Level of Impairment Assistance   6 % Total / Unable   7 - 9 CM 80 - 100% Maximal Assist   10 - 14 CL 60 - 80% Moderate Assist   15 - 19 CK 40 - 60% Moderate Assist   20 - 22 CJ 20 - 40% Minimal Assist   23 CI 1-20% SBA / CGA   24 CH 0% Independent/ Mod I     Patient left up in chair with all lines intact, call button in reach and RN notified. Daughter present.    Assessment:  Sravani Beck is a 89 y.o. female with a medical diagnosis of Embolic stroke involving right middle cerebral artery and presents with improved activity tolerance and functional mobility and activity tolerance this date. Pt with no further Acute PT needs; however, discussed need for HH services and continued activity to prevent decline in functional status. D/C Acute PT services 8/12/2017.    Rehab identified problem list/impairments: Rehab identified problem list/impairments: gait instability, impaired balance    Rehab potential is good.    Activity tolerance: Good    Discharge recommendations: Discharge  Facility/Level Of Care Needs: home health PT     Barriers to discharge: Barriers to Discharge: Decreased caregiver support    Equipment recommendations: Equipment Needed After Discharge: none     GOALS:    Physical Therapy Goals     Not on file          Multidisciplinary Problems (Resolved)        Problem: Physical Therapy Goal    Goal Priority Disciplines Outcome Goal Variances Interventions   Physical Therapy Goal   (Resolved)     PT/OT, PT Outcome(s) achieved     Description:  Goals to be met by: 2017     Patient will increase functional independence with mobility by performin. Sit to stand transfer with Stand-by Assistance  2. Bed to chair transfer with Stand-by Assistance  3. Gait  x 150 feet with Stand-by Assistance using RW or Rollator walker.   4. Pt will ascend/descend curb step with SBA and verbal cues using a RW to enter/exit home environment.   5. Stand for 10 minutes with Stand-by Assistance while performing UE tasks to enable safe participation in self care                   PLAN:    D/C Acute PT services 2017. Home with support of family and  services.  Plan of Care reviewed with: patient, daughter     Dariela MCKEONAlka Garcia, PT, DPT  [phone]  2017

## 2017-08-12 NOTE — PROGRESS NOTES
[facility]  Vascular Neurology  Comprehensive Stroke Center  Progress Note    Assessment/Plan:     89 yo previously on Xarelto presents with SAH  8/12: Discussion with POA, daughter Nena Coleman, and Dr. Melchor was to resume anticoagulation for Afib today. Patient to go home with Home health.    * Embolic stroke involving right middle cerebral artery    89 yo with pAF and HTN, CAD stroke risk factors (Chadsvasc 7) presents with cardioembolic parietal lobe infarcts  -resume ASA, added Pradaxa after extensive discussion with POA, daughter Nena Coleman. See Care Update note dated today.  -PT/OT/SLP- home with home health PT, OT, aide  -follow up MRI brain w/o ordered on 8/11 reveals acute parietal cortical emboli  -ECHO reviewed; patient in permanent Afib, rate controlled.   -continue high intensity statin therapy, rate control, added Pradaxa          Fall    -risk factor for being on anticoagulation; risks reviewed and clearly discussed with POA        Paroxysmal atrial fibrillation    -patient has been off Xarelto for pAF x2 weeks; will resume Pradaxa based on extensive risk:benefit counseling withPOA  CHADSVASC score 7, HASBLED 3.   -home med rate control, EKG confirmed with pAF this admission         Hypertension, benign    -home lisinopril, home Lopressor and BP target less than 160 SBP        CAD (coronary artery disease)    -continue ACEi, BB, statin            Neurologic Chief Complaint: R parietal lobe cortical infarcts    Subjective:     Interval History: Patient is seen for follow-up neurological assessment and treatment recommendations: Patient had confusion overnight requiring Seroquel and attempted to ambulate without assistance. Extensive bedside conversation to resume therapeutic anticoagulation today with Pradaxa. Home with HH.     HPI, Past Medical, Family, and Social History remains the same as documented in the initial encounter.     Review of Systems   Eyes: Negative for  visual disturbance.   Respiratory: Negative for cough and shortness of breath.    Cardiovascular: Negative for chest pain and palpitations.   Gastrointestinal: Negative for constipation, diarrhea, nausea and vomiting.   Genitourinary: Negative for frequency and urgency.   Musculoskeletal: Negative for gait problem.   Skin: Positive for wound (multiple small head lacs).   Neurological: Negative for syncope, speech difficulty, weakness and headaches.   Psychiatric/Behavioral: Positive for behavioral problems and confusion.     Scheduled Meds:   aspirin  325 mg Oral Daily    atorvastatin  40 mg Oral Daily    dabigatran etexilate  150 mg Oral BID    lisinopril  10 mg Oral Daily    magnesium sulfate IVPB  2 g Intravenous Once    metoprolol tartrate  25 mg Oral BID    sodium chloride 0.9%  3 mL Intravenous Q8H     Continuous Infusions:   sodium chloride 0.9%       PRN Meds:acetaminophen, labetalol, ondansetron, sodium chloride 0.9%    Objective:     Vital Signs (Most Recent):  Temp: 97.9 °F (36.6 °C) (17)  Pulse: 94 (17)  Resp: 20 (17)  BP: 123/85 (17)  SpO2: 96 % (17)  BP Location: Left arm    Vital Signs Range (Last 24H):  Temp:  [96.3 °F (35.7 °C)-98.1 °F (36.7 °C)]   Pulse:  []   Resp:  [18-35]   BP: ()/(56-85)   SpO2:  [96 %-100 %]   BP Location: Left arm    Physical Exam   Constitutional: She appears well-developed and well-nourished.   HENT:   Head: Normocephalic and atraumatic.   Eyes: EOM are normal. Pupils are equal, round, and reactive to light.   Neck: Normal range of motion.   Cardiovascular: Normal rate and regular rhythm.    Pulmonary/Chest: No respiratory distress.   Abdominal: Soft. There is no tenderness.   Musculoskeletal: Normal range of motion.   Neurological:   -  Mental Status:  AAOx3.  Follows commands.  Answers correct age and .  Recent and remote memory intact.  Recalls 2/3 objects.  No neglect.    -  Speech and  language:  + aphasia - dysarthria.    -  Coordination:  Finger to nose exam:  RUE normal, LUE normal.   -  Motor:  RUE: 5/5, 5/5 .  LUE: 5/5, 5/5 .  RLE: 5/5, DF 5/5, PF 5/5.  LLE: 5/5, DF 5/5, PF 5/5.   -  pronator drift. Negative   -  Tone:  normal  -  Sensory:  Intact to light touch and pin prick.   Skin: Skin is warm and dry.   Psychiatric: Cognition and memory are impaired.   Vitals reviewed.      Neurological Exam:   Documented in above Gen Phys exam    Stroke Scales  NIHSS: 0, Mod [place]: 0  Laboratory:  CMP:   Recent Labs  Lab 08/12/17  0806   CALCIUM 9.4   ALBUMIN 3.4*   PROT 6.5   *   K 4.1   CO2 27      BUN 12   CREATININE 0.8   ALKPHOS 59   ALT 13   AST 25   BILITOT 0.9     BMP:   Recent Labs  Lab 08/12/17  0806   *   K 4.1      CO2 27   BUN 12   CREATININE 0.8   CALCIUM 9.4     CBC:   Recent Labs  Lab 08/12/17  0501   WBC 7.85   RBC 3.38*   HGB 10.6*   HCT 31.9*      MCV 94   MCH 31.4*   MCHC 33.2     Lipid Panel:   Recent Labs  Lab 08/11/17  0650   CHOL 124   LDLCALC 56.6*   HDL 55   TRIG 62     Coagulation: No results for input(s): INR, APTT in the last 168 hours.    Invalid input(s): PT  Platelet Aggregation Study: No results for input(s): PLTAGG, PLTAGINTERP, PLTAGREGLACO, ADPPLTAGGREG in the last 168 hours.  Hgb A1C:   Recent Labs  Lab 08/11/17  0650   HGBA1C 5.2     TSH:   Recent Labs  Lab 08/11/17  0650   TSH 3.259       Diagnostic Results:  I have personally reviewed: MRI/MRA Head. Date: 8/11/17  Findings: Punctate acute right parietal lobe cortical infarcts.    Prominent chronic microvascular ischemic changes and moderate generalized cerebral edema less.    Unremarkable MRA head specifically without focal stenosis, aneurysm or vascular malformation.      Ben White MD  [facility] Stroke [facility]  Department of Vascular Neurology   [facility]

## 2017-08-12 NOTE — PLAN OF CARE
Problem: Patient Care Overview  Goal: Plan of Care Review  Outcome: Ongoing (interventions implemented as appropriate)  Patient has remained free of falls this shift. She is AAOx4, but does get confused during the night. Daughter has been at the bedside and has been very helpful. Patient did manage to pull out one of her IV's. Luckily she still has one IV left. Patient went down to MRI around 0200 and made it through the MRI. She received one mg Versed IV prior to her going for the test. She was also given 25 mg Seroquel upon returning because she was ready to get up and start pulling out her IV again.

## 2017-08-12 NOTE — PLAN OF CARE
[facility]    HOME HEALTH ORDERS  FACE TO FACE ENCOUNTER    Patient Name: Sravani Beck  YOB: 1924    PCP: Gracia Martinez MD   PCP Address: [address] [place] [address]  PCP Phone Number: [phone]  PCP Fax: [phone]    Encounter Date: 08/12/2017    Admit to Home Health    Diagnoses:  Active Hospital Problems    Diagnosis  POA    *Embolic stroke involving right middle cerebral artery [I63.411]  Unknown     Priority: 1 - High    Fall [W19.XXXA]  Unknown    CAD (coronary artery disease) [I25.10]  Yes    Hypertension, benign [I10]  Yes    Paroxysmal atrial fibrillation [I48.0]  Yes     Dr. Boyer, cardiology        Resolved Hospital Problems    Diagnosis Date Resolved POA    SAH (subarachnoid hemorrhage) [I60.9] 08/12/2017 Yes       No future appointments.  Follow-up Information     Gracia Martinez MD In 5 days.    Specialty:  Family Medicine  Contact information:  [address] [place] [address]  [phone]             [facility] VASCULAR NEUROLOGY In 4 weeks.    Specialty:  Vascular Neurology  Contact information:  [address]  [facility] [address] [phone]                   I have seen and examined this patient face to face today. My clinical findings that support the need for the home health skilled services and home bound status are the following:  Weakness/numbness causing balance and gait disturbance due to Weakness/Debility making it taxing to leave home.    Allergies:  Review of patient's allergies indicates:   Allergen Reactions    No known drug allergies        Diet: cardiac diet    Activities: as tolerated, ambulate with assistance.     Nursing:   SN to complete comprehensive assessment including routine vital signs. Instruct on disease process and s/s of complications to report to MD. Review/verify medication list sent home with the patient at time of discharge  and instruct patient/caregiver as needed. Frequency may be  "adjusted depending on start of care date.    Notify MD if SBP > 160 or < 90; DBP > 90 or < 50; HR > 120 or < 50; Temp > 101;       CONSULTS:    Physical Therapy to evaluate and treat. Evaluate for home safety and equipment needs; Establish/upgrade home exercise program. Perform / instruct on therapeutic exercises, gait training, transfer training, and Range of Motion.  Occupational Therapy to evaluate and treat. Evaluate home environment for safety and equipment needs. Perform/Instruct on transfers, ADL training, ROM, and therapeutic exercises.  Aide to provide assistance with personal care, ADLs, and vital signs.            Medications: Review discharge medications with patient and family and provide education.      Current Discharge Medication List      START taking these medications    Details   aspirin 81 MG Chew Take 1 tablet (81 mg total) by mouth once daily.  Qty: 60 tablet, Refills: 3      atorvastatin (LIPITOR) 40 MG tablet Take 1 tablet (40 mg total) by mouth once daily.  Qty: 60 tablet, Refills: 3      dabigatran etexilate (PRADAXA) 150 mg Cap Take 1 capsule (150 mg total) by mouth 2 (two) times daily. "Do NOT break, chew, or open capsules."  Qty: 60 capsule, Refills: 3         CONTINUE these medications which have NOT CHANGED    Details   glucosamine sulfate 2KCl 1,000 mg Tab Every day      lisinopril 10 MG tablet Take 1 tablet (10 mg total) by mouth once daily.  Qty: 90 tablet, Refills: 3    Associated Diagnoses: Hypertension      metoprolol tartrate (LOPRESSOR) 25 MG tablet Take 25 mg by mouth 2 (two) times daily.          STOP taking these medications       rivaroxaban (XARELTO) 10 mg Tab Comments:   Reason for Stopping:         simvastatin (ZOCOR) 40 MG tablet Comments:   Reason for Stopping:               I certify that this patient is confined to her home and needs intermittent skilled nursing care, physical therapy and occupational therapy   .        "

## 2017-08-12 NOTE — SUBJECTIVE & OBJECTIVE
Neurologic Chief Complaint: R parietal lobe cortical infarcts    Subjective:     Interval History: Patient is seen for follow-up neurological assessment and treatment recommendations: Patient had confusion overnight requiring Seroquel and attempted to ambulate without assistance. Extensive bedside conversation to resume therapeutic anticoagulation today with Pradaxa. Home with HH.     HPI, Past Medical, Family, and Social History remains the same as documented in the initial encounter.     Review of Systems   Eyes: Negative for visual disturbance.   Respiratory: Negative for cough and shortness of breath.    Cardiovascular: Negative for chest pain and palpitations.   Gastrointestinal: Negative for constipation, diarrhea, nausea and vomiting.   Genitourinary: Negative for frequency and urgency.   Musculoskeletal: Negative for gait problem.   Skin: Positive for wound (multiple small head lacs).   Neurological: Negative for syncope, speech difficulty, weakness and headaches.   Psychiatric/Behavioral: Positive for behavioral problems and confusion.     Scheduled Meds:   aspirin  325 mg Oral Daily    atorvastatin  40 mg Oral Daily    dabigatran etexilate  150 mg Oral BID    lisinopril  10 mg Oral Daily    magnesium sulfate IVPB  2 g Intravenous Once    metoprolol tartrate  25 mg Oral BID    sodium chloride 0.9%  3 mL Intravenous Q8H     Continuous Infusions:   sodium chloride 0.9%       PRN Meds:acetaminophen, labetalol, ondansetron, sodium chloride 0.9%    Objective:     Vital Signs (Most Recent):  Temp: 97.9 °F (36.6 °C) (08/12/17 0815)  Pulse: 94 (08/12/17 0815)  Resp: 20 (08/12/17 0815)  BP: 123/85 (08/12/17 0815)  SpO2: 96 % (08/12/17 0815)  BP Location: Left arm    Vital Signs Range (Last 24H):  Temp:  [96.3 °F (35.7 °C)-98.1 °F (36.7 °C)]   Pulse:  []   Resp:  [18-35]   BP: ()/(56-85)   SpO2:  [96 %-100 %]   BP Location: Left arm    Physical Exam   Constitutional: She appears well-developed and  well-nourished.   HENT:   Head: Normocephalic and atraumatic.   Eyes: EOM are normal. Pupils are equal, round, and reactive to light.   Neck: Normal range of motion.   Cardiovascular: Normal rate and regular rhythm.    Pulmonary/Chest: No respiratory distress.   Abdominal: Soft. There is no tenderness.   Musculoskeletal: Normal range of motion.   Neurological:   -  Mental Status:  AAOx3.  Follows commands.  Answers correct age and .  Recent and remote memory intact.  Recalls 2/3 objects.  No neglect.    -  Speech and language:  + aphasia - dysarthria.    -  Coordination:  Finger to nose exam:  RUE normal, LUE normal.   -  Motor:  RUE: 5/5, 5/5 .  LUE: 5/5, 5/5 .  RLE: 5/5, DF 5/5, PF 5/5.  LLE: 5/5, DF 5/5, PF 5/5.   -  pronator drift. Negative   -  Tone:  normal  -  Sensory:  Intact to light touch and pin prick.   Skin: Skin is warm and dry.   Psychiatric: Cognition and memory are impaired.   Vitals reviewed.      Neurological Exam:   Documented in above Gen Phys exam    Stroke Scales  NIHSS: 0, Mod [place]: 0  Laboratory:  CMP:   Recent Labs  Lab 17  0806   CALCIUM 9.4   ALBUMIN 3.4*   PROT 6.5   *   K 4.1   CO2 27      BUN 12   CREATININE 0.8   ALKPHOS 59   ALT 13   AST 25   BILITOT 0.9     BMP:   Recent Labs  Lab 17  0806   *   K 4.1      CO2 27   BUN 12   CREATININE 0.8   CALCIUM 9.4     CBC:   Recent Labs  Lab 17  0501   WBC 7.85   RBC 3.38*   HGB 10.6*   HCT 31.9*      MCV 94   MCH 31.4*   MCHC 33.2     Lipid Panel:   Recent Labs  Lab 17  0650   CHOL 124   LDLCALC 56.6*   HDL 55   TRIG 62     Coagulation: No results for input(s): INR, APTT in the last 168 hours.    Invalid input(s): PT  Platelet Aggregation Study: No results for input(s): PLTAGG, PLTAGINTERP, PLTAGREGLACO, ADPPLTAGGREG in the last 168 hours.  Hgb A1C:   Recent Labs  Lab 17  0650   HGBA1C 5.2     TSH:   Recent Labs  Lab 17  0650   TSH 3.259       Diagnostic  Results:  I have personally reviewed: MRI/MRA Head. Date: 8/11/17  Findings: Punctate acute right parietal lobe cortical infarcts.    Prominent chronic microvascular ischemic changes and moderate generalized cerebral edema less.    Unremarkable MRA head specifically without focal stenosis, aneurysm or vascular malformation.

## 2017-08-12 NOTE — PLAN OF CARE
Problem: Physical Therapy Goal  Goal: Physical Therapy Goal  Goals to be met by: 2017     Patient will increase functional independence with mobility by performin. Sit to stand transfer with Stand-by Assistance  2. Bed to chair transfer with Stand-by Assistance  3. Gait  x 150 feet with Stand-by Assistance using RW or Rollator walker.   4. Pt will ascend/descend curb step with SBA and verbal cues using a RW to enter/exit home environment.   5. Stand for 10 minutes with Stand-by Assistance while performing UE tasks to enable safe participation in self care       Outcome: Outcome(s) achieved Date Met: 17    Pt has met the above goals to the satisfaction of this PT. No further Acute PT needs. D/C Acute PT services 2017. Home with HH recommended.    Dariela Garcia, PT, DPT  [phone]  2017

## 2017-08-12 NOTE — CARE UPDATE
Had extensive bedside conversation with patient and her power of , her daughter regarding risk:benefit of resuming a direct oral anticoagulant, likely now Pradaxa, given fall risk with age (89 yo). Patient falls about once per year per daughter, and is currently living at home without any assistance, although the family has recently been discussing needs for home health. Her last fall occurred prior to admission while hospitalized in [place], and her hospital course at [facility] this admission has been complicated by nighttime delirium and getting out of bed unassisted. The daughter is in favor of resuming anticoagulation, and the patient's Cardiologist recently engaged in the same conversation about 2 weeks ago, when Xarelto was discontinued. Since then, the patient has had acute strokes - noted this admission on MRI - as punctate acute R parietal lobe cortical infarcts. She will likely need therapeutic A/C given acute history of stroke, likely cardioembolic.     Ben White MD  PGY-2 Internal Medicine

## 2017-08-12 NOTE — DISCHARGE SUMMARY
[facility]  Vascular Neurology  Comprehensive Stroke Center  Discharge Summary     Summary:     Admit Date: 8/11/2017  6:07 AM    Discharge Date and Time:  08/12/2017 12:24 PM    Attending Physician: Yaneth Melchor MD    Discharge Provider: Ben White MD    History of Present Illness: Ms. Beck is a 89 yo F with pAF previously on Xarelto (discontinued 2 weeks prior to admission after fall risk assessed by outside physician, on sotalol), CAD s/p CABG on daily RRN056, HTN, CKD, HPLD was admitted to [place] on 8/10/17 with ~20 minutes of dysarthria and confusion that began at 10am and CT head after a fall showed an intraparenchymal hemorrhage. Patient also reportedly had a recurrence of confusion/dyarthria intermittenly in the few hours after presenting to the OSH. Patient has been intermittently symptomatic and stroke workup was performed at [place]. Last night, she was found down on the floor, unwitnessed, +LOC with 3 minor scalp lacerations, and patient was AOx1 at the time of the event. Repeat CT head showed ICH with a potential small SAH and she was transferred [place] [facility] [place] higher level of care and Vascular Neuro consultation.     Hospital Course (synopsis of major diagnoses, care, treatment, and services provided during the course of the hospital stay): 89 yo previously on Xarelto presents with embolic infarcts    8/12: Discussion with LORRI, daughter Nena Coleman, and Dr. Melchor was to resume anticoagulation for Afib today. Patient to go home with Home health.    * Embolic stroke involving right middle cerebral artery     89 yo with pAF and HTN, CAD stroke risk factors (Chadsvasc 7) presents with cardioembolic parietal lobe infarcts  -resume ASA, added Pradaxa after extensive discussion with LORRI, daughter Nena Coleman. See Care Update note dated today.  -PT/OT/SLP- home with home health PT, OT, aide  -follow up MRI brain w/o ordered on 8/11 reveals acute  parietal cortical emboli  -ECHO reviewed; patient in permanent Afib, rate controlled.   -continue high intensity statin therapy, rate control, added Pradaxa          Fall     -risk factor for being on anticoagulation; risks reviewed and clearly discussed with POA       Paroxysmal atrial fibrillation     -patient has been off Xarelto for pAF x2 weeks; will resume Pradaxa based on extensive risk:benefit counseling withPOA  CHADSVASC score 7, HASBLED 3.   -home med rate control, EKG confirmed with pAF this admission        Hypertension, benign     -home lisinopril, home Lopressor and BP target less than 160 SBP       CAD (coronary artery disease)     -continue ACEi, BB, statin     Stroke Scales  NIHSS: 0, Mod [place]: 0    No new subjective & objective note has been filed under this hospital service since the last note was generated.      Assessment/Plan:     Interventions: medical mgmt    Complications: None    Research Candidate?:  No--> will reassess in chart    Neurological deficit at discharge: Dysarthria (resolved)     Disposition: home with home health PT,OT, aide    Final Active Diagnoses:    Diagnosis Date Noted POA    PRINCIPAL PROBLEM:  Embolic stroke involving right middle cerebral artery [I63.411] 08/11/2017 Unknown    Fall [W19.XXXA] 08/11/2017 Unknown    CAD (coronary artery disease) [I25.10] 04/29/2012 Yes    Hypertension, benign [I10] 04/29/2012 Yes    Paroxysmal atrial fibrillation [I48.0] 04/29/2012 Yes      Problems Resolved During this Admission:    Diagnosis Date Noted Date Resolved POA    SAH (subarachnoid hemorrhage) [I60.9] 08/11/2017 08/12/2017 Yes     No new Assessment & Plan notes have been filed under this hospital service since the last note was generated.  Service: Vascular Neurology      Recommendations:     Post-discharge complication risks: Falls, Skin breakdown    Stroke Education given to: patient and family    Follow-up in Stroke Clinic in 28 days    Discharge  "Plan:  Antithrombotics: Aspirin 81 mg  Statin, Pradaxa as anticoagulation, Lopressor    Follow Up:  Follow-up Information     Gracia Martinez MD In 5 days.    Specialty:  Family Medicine  Contact information:  [address] [place] [address]  [phone]             [facility] VASCULAR NEUROLOGY In 4 weeks.    Specialty:  Vascular Neurology  Contact information:  [address]  [facility] [address]  [phone]               Patient Instructions:     Diet general     Call MD for:  temperature >100.4     Call MD for:  persistent nausea and vomiting or diarrhea     Call MD for:  severe uncontrolled pain     Call MD for:  redness, tenderness, or signs of infection (pain, swelling, redness, odor or green/yellow discharge around incision site)     Call MD for:  difficulty breathing or increased cough     Call MD for:  severe persistent headache     Call MD for:  worsening rash     Call MD for:  persistent dizziness, light-headedness, or visual disturbances     Call MD for:  increased confusion or weakness       Medications:  Reconciled Home Medications:   Current Discharge Medication List      START taking these medications    Details   aspirin 81 MG Chew Take 1 tablet (81 mg total) by mouth once daily.  Qty: 60 tablet, Refills: 3      atorvastatin (LIPITOR) 40 MG tablet Take 1 tablet (40 mg total) by mouth once daily.  Qty: 60 tablet, Refills: 3      dabigatran etexilate (PRADAXA) 150 mg Cap Take 1 capsule (150 mg total) by mouth 2 (two) times daily. "Do NOT break, chew, or open capsules."  Qty: 60 capsule, Refills: 3         CONTINUE these medications which have NOT CHANGED    Details   glucosamine sulfate 2KCl 1,000 mg Tab Every day      lisinopril 10 MG tablet Take 1 tablet (10 mg total) by mouth once daily.  Qty: 90 tablet, Refills: 3    Associated Diagnoses: Hypertension      metoprolol tartrate (LOPRESSOR) 25 MG tablet Take 25 mg by mouth 2 (two) times daily.          STOP taking these medications "       rivaroxaban (XARELTO) 10 mg Tab Comments:   Reason for Stopping:         simvastatin (ZOCOR) 40 MG tablet Comments:   Reason for Stopping:               Ben White MD  [facility] Stroke Center  Department of Vascular Neurology   [facility]

## 2017-08-12 NOTE — PROGRESS NOTES
Saline lock and telemetry monitor were dc'd.Discharge instructions and new prescriptions were given to pt/family with understanding verbalized.Pt awake,alert,oriented,verbally responsive.No distress noted,breathing unlabored.Family present at bedside.Pt escort was requested.Will continue to monitor.

## 2017-08-12 NOTE — ASSESSMENT & PLAN NOTE
89 yo with pAF and HTN, CAD stroke risk factors (Chadsvasc 7) presents with cardioembolic parietal lobe infarcts  -resume ASA, added Pradaxa after extensive discussion with POA, daughter Nena Coleman. See Care Update note dated today.  -PT/OT/SLP- home with home health   -follow up MRI brain w/o ordered on 8/11  -ECHO reviewed; patient in permanent Afib, rate controlled.   -continue high intensity statin therapy

## 2017-08-13 NOTE — PT/OT/SLP DISCHARGE
Occupational Therapy Discharge Summary    Sravani Beck  MRN: [id number]   Embolic stroke involving right middle cerebral artery   Patient Discharged from acute Occupational Therapy on 8/12.  Please refer to prior OT note dated on 8/11 for functional status.     Assessment:   Patient appropriate for care in another setting.  GOALS:    Occupational Therapy Goals     Not on file          Multidisciplinary Problems (Resolved)        Problem: Occupational Therapy Goal    Goal Priority Disciplines Outcome Interventions   Occupational Therapy Goal   (Resolved)     OT, PT/OT Outcome(s) achieved    Description:  Goals set 8/11 to be addressed for 7 days with expiration date, 8/18:  Patient will increase functional independence with ADLs by performing:    Patient will demonstrate rolling to the right with modified independence.  Not met   Patient will demonstrate rolling to the left with modified independence.   Not met  Patient will demonstrate supine -sit with modified independence.   Not met  Patient will demonstrate stand pivot transfers with modified independence.   Not met  Patient will demonstrate grooming while standing with modified independence.   Not met  Patient will demonstrate upper body dressing with modified independence.   Not met  Patient will demonstrate lower body dressing with modified independence.   Not met  Patient will demonstrate toileting with modified independence.   Not met  Patient's family / caregiver will demonstrate independence and safety with assisting patient with self-care skills and functional mobility.     Not met  Patient and/or patient's family will verbalize understanding of stroke prevention guidelines, personal risk factors and stroke warning signs via teachback method.  Not met                         Reasons for Discontinuation of Therapy Services  Transfer to alternate level of care.      Plan:  Patient Discharged to: Home with Home Health Service.  Mikala Pinon  LOTR  8/12/2017

## 2017-08-14 NOTE — PROCEDURES
DATE OF SERVICE:  08/11/2017.    EEG #:  [id number].    REQUESTING PHYSICIAN:  Dr. Patel.    LOCATION OF SERVICE:  726.    ELECTROENCEPHALOGRAM REPORT    METHODOLOGY:  Electroencephalographic (EEG) recording is recorded with   electrodes placed according to the International 10-20 placement system.  Thirty   two (32) channels of digital signal (sampling rate of 512/sec), including T1   and T2, were simultaneously recorded from the scalp and may include EKG, EMG,   and/or eye monitors.  Recording band pass was 0.1 to 512 Hz.  Digital video   recording of the patient is simultaneously recorded with the EEG.  The patient   is instructed to report clinical symptoms which may occur during the recording   session.  EEG and video recording are stored and archived in digital format.    Activation procedures, which include photic stimulation, hyperventilation and   instructing patients to perform simple tasks, are done in selected patients.  The EEG is displayed on a monitor screen and can be reviewed using different   montages.  Computer assisted-analysis is employed to detect spike and   electrographic seizure activity.   The entire record is submitted for computer   analysis.  The entire recording is visually reviewed, and the times identified   by computer analysis as being spikes or seizures are reviewed again.  Compressed spectral analysis (CSA) is also performed on the activity recorded   from each individual channel.  This is displayed as a power display of   frequencies from 0 to 30 Hz over time.   The CSA is reviewed looking for   asymmetries in power between homologous areas of the scalp, then compared with   the original EEG recording.  Downstream software was also utilized in the review of this study.  This software   suite analyzes the EEG recording in multiple domains.  Coherence and rhythmicity   are computed to identify EEG sections which may contain organized seizures.    Each channel undergoes analysis to  detect the presence of spike and sharp waves   which have special and morphological characteristics of epileptic activity.  The   routine EEG recording is converted from special into frequency domain.  This is   then displayed comparing homologous areas to identify areas of significant   asymmetry.  Algorithm to identify non-cortically generated artifact is used to   separate artifact from the EEG.  EEG FINDINGS:  The patient was awake throughout the recording.  She was very   active talking and moving her head almost continuously.  Prominent muscle   artifact was present which limited the interpretation of the recording.  A   posterior dominant rhythm was evident with somewhat rhythmic 7-Hz frequency seen   in the occipital region.  Beta was seen diffusely.  There were no clear   lateralized or focal changes, and no epileptic activity was seen.  Activation   procedures were not carried out.  IMPRESSION:  Mildly abnormal EEG with slowing of the posterior dominant rhythm,   but limited by the continuous movement of the patient during the recording.    CLINICAL CORRELATION:  The patient is a 89-year-old female who presented to   outside hospital being found down on the floor with head trauma evident possibly   from a fall.  This tracing, however, does not show evidence of focal   dysfunction and only a mild slowing of the predominant rhythms.      RR/HN  dd: 08/13/2017 12:41:07 (CDT)  td: 08/13/2017 13:39:37 (CDT)  Doc ID   #[id number]  Job ID #[id number]    CC:

## 2017-08-15 ENCOUNTER — DOCUMENTATION ONLY (OUTPATIENT)
Dept: FAMILY MEDICINE | Facility: CLINIC | Age: 82
End: 2017-08-15

## 2017-08-15 NOTE — PROGRESS NOTES
Pre-Visit Chart Review  For Appointment Scheduled on 8/22/17    Health Maintenance Due   Topic Date Due    TETANUS VACCINE  07/15/1942    Pneumococcal (65+) (2 of 2 - PPSV23) 10/08/2016    Influenza Vaccine  08/01/2017

## 2017-08-16 NOTE — PHYSICIAN QUERY
PT Name: Sravani Beck  MR #: [id number]     Physician Query Form - Documentation Clarification      CDS/: LENORE Avelar RN               Contact information: [phone]    This form is a permanent document in the medical record.     Query Date: August 16, 2017    By submitting this query, we are merely seeking further clarification of documentation. Please utilize your independent clinical judgment when addressing the question(s) below.    The Medical record reflects the following:    Supporting Clinical Findings Location in Medical Record   Neurological: Positive for syncope and speech difficulty  Positive for confusion       Prominent chronic microvascular ischemic changes and moderate generalized cerebral edema less.      H & P 8/11          MRI Brain 8/12                                                                                      Doctor, Please specify diagnosis or diagnoses associated with above clinical findings.    Provider Use Only      [  X   ]  Cerebral edema    [     ]  Other                                                                                                                         [  ] Clinically undetermined

## 2017-08-22 ENCOUNTER — OFFICE VISIT (OUTPATIENT)
Dept: FAMILY MEDICINE | Facility: CLINIC | Age: 82
End: 2017-08-22
Payer: MEDICARE

## 2017-08-22 VITALS
WEIGHT: 108.44 LBS | OXYGEN SATURATION: 98 % | SYSTOLIC BLOOD PRESSURE: 113 MMHG | BODY MASS INDEX: 19.96 KG/M2 | DIASTOLIC BLOOD PRESSURE: 67 MMHG | HEART RATE: 83 BPM | TEMPERATURE: 98 F | RESPIRATION RATE: 12 BRPM | HEIGHT: 62 IN
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
## 2017-08-22 DIAGNOSIS — I48.0 PAROXYSMAL ATRIAL FIBRILLATION: ICD-10-CM

## 2017-08-22 DIAGNOSIS — S01.01XD SCALP LACERATION, SUBSEQUENT ENCOUNTER: ICD-10-CM

## 2017-08-22 DIAGNOSIS — I63.411 EMBOLIC STROKE INVOLVING RIGHT MIDDLE CEREBRAL ARTERY: ICD-10-CM

## 2017-08-22 DIAGNOSIS — H61.22 IMPACTED CERUMEN OF LEFT EAR: ICD-10-CM

## 2017-08-22 DIAGNOSIS — I10 HYPERTENSION, BENIGN: Primary | ICD-10-CM

## 2017-08-22 PROCEDURE — 99495 TRANSJ CARE MGMT MOD F2F 14D: CPT | Mod: S$GLB,,, | Performed by: PHYSICIAN ASSISTANT

## 2017-08-22 PROCEDURE — 99999 PR PBB SHADOW E&M-EST. PATIENT-LVL IV: CPT | Mod: PBBFAC,,, | Performed by: PHYSICIAN ASSISTANT

## 2017-08-22 PROCEDURE — 99499 UNLISTED E&M SERVICE: CPT | Mod: S$GLB,,, | Performed by: PHYSICIAN ASSISTANT

## 2017-08-22 NOTE — PATIENT INSTRUCTIONS
Established High Blood Pressure    High blood pressure (hypertension) is a chronic disease. Often health care providers dont know what causes it. But it can be caused by certain health conditions and medicines.  If you have high blood pressure, you may not have any symptoms. If you do have symptoms, they may include headache, dizziness, changes in your vision, chest pain, and shortness of breath. But even without symptoms, high blood pressure thats not treated raises your risk for heart attack and stroke. High blood pressure is a serious health risk and shouldnt be ignored.  A blood pressure reading is made up of two numbers: a higher number over a lower number. The top number is the systolic pressure. The bottom number is the diastolic pressure. A normal blood pressure is less than 120 over less than 80.  High blood pressure is when either the top number is 140 or higher, or the bottom number is 90 or higher. This must be the result when taking your blood pressure a number of times. The blood pressures between normal and high are called prehypertension.  Home care  If you have high blood pressure, you should do what is listed below to lower your blood pressure. If you are taking medicines for high blood pressure, these methods may reduce or end your need for medicines in the future.  · Begin a weight-loss program if you are overweight.  · Cut back on how much salt you get in your diet. Heres how to do this:  ¨ Dont eat foods that have a lot of salt. These include olives, pickles, smoked meats, and salted potato chips.  ¨ Dont add salt to your food at the table.  ¨ Use only small amounts of salt when cooking.  · Begin an exercise program. Talk with your health care provider about the type of exercise program that would be best for you. It doesn't have to be hard. Even brisk walking for 20 minutes 3 times a week is a good form of exercise.  · Dont take medicines that have heart stimulants. This includes many  cold and sinus decongestant pills and sprays, as well as diet pills. Check the warnings about hypertension on the label. Stimulants such as amphetamine or cocaine could be lethal for someone with high blood pressure. Never take these.  · Limit how much caffeine you get in your diet. Switch to caffeine-free products.  · Stop smoking. If you are a long-time smoker, this can be hard. Enroll in a stop-smoking program to make it more likely that you will quit for good.  · Learn how to handle stress. This is an important part of any program to lower blood pressure. Learn about relaxation methods like meditation, yoga, or biofeedback.  · If your provider prescribed medicines, take them exactly as directed. Missing doses may cause your blood pressure get out of control.  · Consider buying an automatic blood pressure machine. You can get one of these at most pharmacies. Use this to watch your blood pressure at home. Give the results to your provider.  Follow-up care  You will need to make regular visits to your health care provider. This is to check your blood pressure and to make changes to your medicines. Make a follow-up appointment as directed.  When to seek medical advice  Call your health care provider right away if any of these occur:  · Chest pain or shortness of breath  · Severe headache  · Throbbing or rushing sound in the ears  · Nosebleed  · Sudden severe pain in your belly (abdomen)  · Extreme drowsiness, confusion, or fainting  · Dizziness or dizziness with a spinning sensation (vertigo)  · Weakness of an arm or leg or one side of the face  · You have problems speaking or seeing   Date Last Reviewed: 11/25/2014  © 2737-6253 [employer]. [address] [place] [address], [place] [address]. All rights reserved. This information is not intended as a substitute for professional medical care. Always follow your healthcare professional's instructions.

## 2017-08-22 NOTE — PROGRESS NOTES
Subjective:       Patient ID: Sravani Beck is a 89 y.o. female.    Chief Complaint: Transitional Care    Ms. Beck comes to clinic today for hospital follow up. The patient was orginally admitted to [facility] on 08/10/17 after her daughter found her with symptoms of dysarthria and confusion. The patient was admitted to [facility] for stroke work up. The patient was found on the floor at [facility]. The fall was unwitness. There was no LOC and 3 small scalp lacerations. One of the lacerations was large enough to be sutured; the patient received 2 stitches.The patient was oriented x2 after the fall. The patient had  A CT scan after the fall that revealed ICH with potentially a small SAH; she was transferred to neurocritical care at [facility] on 08/11/17 for higher level fo care and vascular Neuro consult. The patient had previously been on xarelto due to A fib; this was stopped 2 weeks prior to the event due to fall risks. It was decided that patient would restart anticoagulation. She was discharged on pradaxa, aspirin, and lipitor. The patient has already followed up with her cardiologist; Dr. Boyer will manage her pradaxa. The patient is accompanied by her daughter today. The daughter reports that her mother has been doing well. They have no complaints today. The patient continues to live alone; she has had no further falls. No additional instances of AMS.  The patient's daughter lives 3 doors down. The patient has visits throughout the day from her children. Her daughter also calls her twice daily to remind her to take her medication. They do request that her ears be checked for wax.     Transitional Care Note  Admit date: 08/10/17  Discharge date: 08/12/17  Date of interactive contact (2 business days post D/C): 08/15/17  Hospitalized at: [facility] and [facility]  Discharge diagnoses: AMS, embolic stroke    Family and/or Caretaker present at visit?  Yes.  Diagnostic tests reviewed/disposition: I have reviewed all completed as  well as pending diagnostic tests at the time of discharge.  Disease/illness education: Patient's family encouraged to be sure patient is compliant with medications  Medication changes: Patient started on pradaxa  Home health/community services discussion/referrals: Patient does not have home health established from hospital visit.  They do not need home health.  If needed, we will set up home health for the patient.   Establishment or re-establishment of referral orders for community resources: No other necessary community resources.   Discussion with other health care providers: No discussion with other health care providers necessary.           Review of Systems   Constitutional: Negative for activity change, appetite change and fever.   HENT: Positive for hearing loss. Negative for postnasal drip, rhinorrhea and sinus pressure.    Eyes: Negative for visual disturbance.   Respiratory: Negative for cough and shortness of breath.    Cardiovascular: Negative for chest pain.   Gastrointestinal: Negative for abdominal distention and abdominal pain.   Genitourinary: Negative for difficulty urinating and dysuria.   Musculoskeletal: Negative for arthralgias and myalgias.   Neurological: Negative for headaches.   Hematological: Negative for adenopathy.   Psychiatric/Behavioral: The patient is not nervous/anxious.        Objective:      Physical Exam   Constitutional: She is oriented to person, place, and time.   HENT:   Head:       Mouth/Throat: Oropharynx is clear and moist. No oropharyngeal exudate.   Left TM unable to be visualized due to presence of cerumen  Left external ear canal irrigated, cerumen removed and left TM now able to be visualized.    Eyes: Conjunctivae are normal. Pupils are equal, round, and reactive to light.   Cardiovascular: Normal rate.    Irregularly irregular rhythm   Pulmonary/Chest: Effort normal and breath sounds normal. She has no wheezes.   Abdominal: Soft. Bowel sounds are normal. She  exhibits no distension. There is no tenderness.   Musculoskeletal: She exhibits no edema.   Lymphadenopathy:     She has no cervical adenopathy.   Neurological: She is alert and oriented to person, place, and time.   Skin: No erythema.   Psychiatric: Her behavior is normal.       Assessment:       1. Hypertension, benign    2. Paroxysmal atrial fibrillation    3. Fall, subsequent encounter    4. Scalp laceration, subsequent encounter    5. Impacted cerumen of left ear    6. Embolic stroke involving right middle cerebral artery        Plan:   Sravani was seen today for transitional care.    Diagnoses and all orders for this visit:    Hypertension, benign  Controlled  Low salt diet  Continue current medication  Paroxysmal atrial fibrillation  Continue follow up with Dr. Boyer  Continue current medication  Embolic stroke involving right middle cerebral artery  Patient recovering well.   Continue current medication  Fall, subsequent encounter  No further falls  Scalp laceration, subsequent encounter  3 sutures removed  Patient tolerated well  Impacted cerumen of left ear  Cerumen removed  Debrox drops OTC

## 2017-08-23 ENCOUNTER — TELEPHONE (OUTPATIENT)
Dept: FAMILY MEDICINE | Facility: CLINIC | Age: 82
End: 2017-08-23

## 2017-08-23 DIAGNOSIS — E87.1 HYPONATREMIA: ICD-10-CM

## 2017-08-23 DIAGNOSIS — D64.9 ANEMIA, UNSPECIFIED TYPE: Primary | ICD-10-CM

## 2017-08-23 DIAGNOSIS — R93.89 ABNORMAL CHEST X-RAY: ICD-10-CM

## 2017-08-23 NOTE — TELEPHONE ENCOUNTER
Patient's records from [facility] reviewed. Patient needs some repeat blood work and repeat CXR. Please schedule. Thanks!

## 2017-09-07 ENCOUNTER — OFFICE VISIT (OUTPATIENT)
Dept: NEUROLOGY | Facility: CLINIC | Age: 82
End: 2017-09-07
Payer: MEDICARE

## 2017-09-07 VITALS
BODY MASS INDEX: 19.69 KG/M2 | HEIGHT: 62 IN | DIASTOLIC BLOOD PRESSURE: 76 MMHG | WEIGHT: 107 LBS | HEART RATE: 83 BPM | SYSTOLIC BLOOD PRESSURE: 136 MMHG | RESPIRATION RATE: 16 BRPM

## 2017-09-07 DIAGNOSIS — I48.0 PAROXYSMAL ATRIAL FIBRILLATION: ICD-10-CM

## 2017-09-07 DIAGNOSIS — I67.2 ARTERIOSCLEROTIC CEREBROVASCULAR DISEASE: ICD-10-CM

## 2017-09-07 DIAGNOSIS — I63.411 EMBOLIC STROKE INVOLVING RIGHT MIDDLE CEREBRAL ARTERY: Primary | ICD-10-CM

## 2017-09-07 PROCEDURE — 99215 OFFICE O/P EST HI 40 MIN: CPT | Mod: S$GLB,,, | Performed by: PSYCHIATRY & NEUROLOGY

## 2017-09-07 PROCEDURE — 99999 PR PBB SHADOW E&M-EST. PATIENT-LVL III: CPT | Mod: PBBFAC,,, | Performed by: PSYCHIATRY & NEUROLOGY

## 2017-09-07 PROCEDURE — 3008F BODY MASS INDEX DOCD: CPT | Mod: S$GLB,,, | Performed by: PSYCHIATRY & NEUROLOGY

## 2017-09-07 PROCEDURE — 99499 UNLISTED E&M SERVICE: CPT | Mod: S$GLB,,, | Performed by: PSYCHIATRY & NEUROLOGY

## 2017-09-07 PROCEDURE — 1126F AMNT PAIN NOTED NONE PRSNT: CPT | Mod: S$GLB,,, | Performed by: PSYCHIATRY & NEUROLOGY

## 2017-09-07 PROCEDURE — 1159F MED LIST DOCD IN RCRD: CPT | Mod: S$GLB,,, | Performed by: PSYCHIATRY & NEUROLOGY

## 2017-09-07 NOTE — PROGRESS NOTES
Subjective:      9/14/2017       Patient ID: Sravani Beck is a left handed 89 y.o.  female who presents for memory loss and embolic stroke    History of Present Illness    Reviewed records in [facility] and notes from cardiologist, Dr. Alvaro Boyer.  She has a history of atrial fibrillation.  Previously she had been on Xarelto, this was stopped July 27 for noncompliance with taking the Xarelto daily, frequent falls and low platelet count.  She was brought into the emergency room at hospital in [place], transferred to [facility].  Brain MRI showed multiple small punctate infarcts in the right MCA distribution.  She was started on dabigatran 150 mg twice a day and is also taking aspirin 81 mg daily.    She has a history of thrombocytopenia.     She herself does not have any recollection of the events from August.  Hx of memory loss, which was initially why she was going to see me but had the stroke in the interim.     She enjoys playing cards - still plays poker, [employer] and wins.  Forgetful of some remote information but worse with recent - does not recall the stroke or being in the hospital.  Lives independently, daughter 2 houses down.  Daughters manager her finances, shopping, etc.  Not driving. Can reheat foods but family cooks meals.  She is independent in feeding, dressing, bathing, etc.  Family calls to remind her to take pills, has an electronic medication reminder to help.  Daughters check in on her daily. Family does not have safety concerns at this time with her current arrangement and living situation.     No hx of seizure. No hx of bleeding issues - no GI bleeds, no ICH, bloody noses, etc. She has had maybe 2 falls in the past 5 years.     Age 2 - 75 years old or older   CHF History 0 - no   HTN History 1 - yes   Stroke/TIA/Thromboembolism History 2 - yes   Vascular Disease History 1 - yes   Diabetes Mellitus in history 0 - no   Female 1 - yes   [id number] VASc Scale Total Score 7     Review of patient's  "allergies indicates:   Allergen Reactions    No known drug allergies      Current Outpatient Prescriptions   Medication Sig Dispense Refill    aspirin 81 MG Chew Take 1 tablet (81 mg total) by mouth once daily. 60 tablet 3    atorvastatin (LIPITOR) 40 MG tablet Take 1 tablet (40 mg total) by mouth once daily. 60 tablet 3    dabigatran etexilate (PRADAXA) 150 mg Cap Take 1 capsule (150 mg total) by mouth 2 (two) times daily. "Do NOT break, chew, or open capsules." 60 capsule 3    glucosamine sulfate 2KCl 1,000 mg Tab Every day      lisinopril 10 MG tablet Take 1 tablet (10 mg total) by mouth once daily. 90 tablet 3    metoprolol tartrate (LOPRESSOR) 25 MG tablet Take 25 mg by mouth 2 (two) times daily.        No current facility-administered medications for this visit.        Past Medical History  Past Medical History:   Diagnosis Date    AF (paroxysmal atrial fibrillation)     CAD (coronary artery disease) 1970    CABG x 4    HTN (hypertension), benign     Hyperlipidemia LDL goal <70        Past Surgical History  Past Surgical History:   Procedure Laterality Date    CARDIOVERSION  2/05 & 10/05    CHOLECYSTECTOMY      CORONARY ARTERY BYPASS GRAFT  1994    x 3    HYSTERECTOMY         Family History  Family History   Problem Relation Age of Onset    Cerebral aneurysm Mother     Cancer Neg Hx        Social History  Social History     Social History    Marital status:      Spouse name: N/A    Number of children: N/A    Years of education: N/A     Occupational History    Not on file.     Social History Main Topics    Smoking status: Never Smoker    Smokeless tobacco: Never Used    Alcohol use 4.2 oz/week     7 Glasses of wine per week    Drug use: No    Sexual activity: Not on file     Other Topics Concern    Not on file     Social History Narrative    No narrative on file        Review of Systems  Review of Systems    Objective:        Vitals:    09/07/17 1259   BP: 136/76   Pulse: 83 "   Resp: 16     Body mass index is 19.57 kg/m².  Neurologic Exam     Mental Status   Oriented to person.   Disoriented to city. Oriented to country and area.   Disoriented to year. Oriented to month, day and season.   Registration: recalls 3 of 3 objects. Recall of objects at 5 minutes: 0/3.   Level of consciousness: alert  Able to name object. Able to repeat. Normal comprehension.   [facility]    On clock drawing test, placed 12 and 6 positions first, placed hands too early but then correctly placed to indicate time of 7:15.  Some impulsivity.      Cranial Nerves   Cranial nerves II through XII intact.     Motor Exam     Strength   Strength 5/5 except as noted. 4+/5 left AD, KF otherwise full     Sensory Exam   Light touch normal.   Left leg vibration: decreased from ankle  Pinprick normal.   Slightly decreased temp, vib left ankle     Gait, Coordination, and Reflexes     Gait  Gait: normal    Coordination   Romberg: negative    Tremor   Resting tremor: absent    Reflexes   Right brachioradialis: 1+  Left brachioradialis: 2+  Right biceps: 1+  Left biceps: 2+  Right triceps: 1+  Left triceps: 2+  Right patellar: 2+  Left patellar: 3+  Right achilles: 1+  Left achilles: 2+  Right plantar: normal  Left plantar: normal      Physical Exam   Neurological: She has a normal Romberg Test. Gait normal.   Reflex Scores:       Tricep reflexes are 1+ on the right side and 2+ on the left side.       Bicep reflexes are 1+ on the right side and 2+ on the left side.       Brachioradialis reflexes are 1+ on the right side and 2+ on the left side.       Patellar reflexes are 2+ on the right side and 3+ on the left side.       Achilles reflexes are 1+ on the right side and 2+ on the left side.        Data Review:   Results for DELORES MICHEL (MRN [id number]) as of 9/7/2017 13:10   Ref. Range 8/12/2017 05:01   WBC Latest Ref Range: 3.90 - 12.70 K/uL 7.85   RBC Latest Ref Range: 4.00 - 5.40 M/uL 3.38 (L)   Hemoglobin Latest Ref Range:  12.0 - 16.0 g/dL 10.6 (L)   Hematocrit Latest Ref Range: 37.0 - 48.5 % 31.9 (L)   MCV Latest Ref Range: 82 - 98 fL 94   MCH Latest Ref Range: 27.0 - 31.0 pg 31.4 (H)   MCHC Latest Ref Range: 32.0 - 36.0 g/dL 33.2   RDW Latest Ref Range: 11.5 - 14.5 % 13.8   Platelets Latest Ref Range: 150 - 350 K/uL 179   MPV Latest Ref Range: 9.2 - 12.9 fL 12.0   Gran% Latest Ref Range: 38.0 - 73.0 % 47.4   Gran # Latest Ref Range: 1.8 - 7.7 K/uL 3.7   Lymph% Latest Ref Range: 18.0 - 48.0 % 31.8   Lymph # Latest Ref Range: 1.0 - 4.8 K/uL 2.5   Mono% Latest Ref Range: 4.0 - 15.0 % 16.6 (H)   Mono # Latest Ref Range: 0.3 - 1.0 K/uL 1.3 (H)   Eosinophil% Latest Ref Range: 0.0 - 8.0 % 3.7   Eos # Latest Ref Range: 0.0 - 0.5 K/uL 0.3   Basophil% Latest Ref Range: 0.0 - 1.9 % 0.1   Baso # Latest Ref Range: 0.00 - 0.20 K/uL 0.01   Results for DELORES MICHEL (MRN [id number]) as of 9/7/2017 13:10   Ref. Range 8/11/2017 06:50 8/11/2017 12:08 8/12/2017 02:01 8/12/2017 02:01 8/12/2017 05:01 8/12/2017 08:06   Sodium Latest Ref Range: 136 - 145 mmol/L 137     135 (L)   Potassium Latest Ref Range: 3.5 - 5.1 mmol/L 4.0     4.1   Chloride Latest Ref Range: 95 - 110 mmol/L 104     102   CO2 Latest Ref Range: 23 - 29 mmol/L 21 (L)     27   Anion Gap Latest Ref Range: 8 - 16 mmol/L 12     6 (L)   BUN, Bld Latest Ref Range: 10 - 30 mg/dL 15     12   Creatinine Latest Ref Range: 0.5 - 1.4 mg/dL 0.8     0.8   eGFR if non African American Latest Ref Range: >60 mL/min/1.73 m^2 >60.0     >60.0   eGFR if African American Latest Ref Range: >60 mL/min/1.73 m^2 >60.0     >60.0   Glucose Latest Ref Range: 70 - 110 mg/dL 98     100   Calcium Latest Ref Range: 8.7 - 10.5 mg/dL 9.5     9.4   Phosphorus Latest Ref Range: 2.7 - 4.5 mg/dL     3.0    Magnesium Latest Ref Range: 1.6 - 2.6 mg/dL     1.6    Alkaline Phosphatase Latest Ref Range: 55 - 135 U/L 54 (L)     59   Total Protein Latest Ref Range: 6.0 - 8.4 g/dL 6.4     6.5   Albumin Latest Ref Range: 3.5 - 5.2 g/dL  3.4 (L)     3.4 (L)   Total Bilirubin Latest Ref Range: 0.1 - 1.0 mg/dL 0.6     0.9   AST Latest Ref Range: 10 - 40 U/L 29     25   ALT Latest Ref Range: 10 - 44 U/L 13     13   Triglycerides Latest Ref Range: 30 - 150 mg/dL 62        Cholesterol Latest Ref Range: 120 - 199 mg/dL 124        HDL Latest Ref Range: 40 - 75 mg/dL 55        LDL Cholesterol Latest Ref Range: 63.0 - 159.0 mg/dL 56.6 (L)        Total Cholesterol/HDL Ratio Latest Ref Range: 2.0 - 5.0  2.3            Assessment:       1. Embolic stroke involving right middle cerebral artery    2. Paroxysmal atrial fibrillation    3. Arteriosclerotic cerebrovascular disease        Plan:         Problem List Items Addressed This Visit        Neuro    Embolic stroke involving right middle cerebral artery - Primary    Arteriosclerotic cerebrovascular disease       Cardiac/Vascular    Paroxysmal atrial fibrillation    Overview     Dr. Boyer, cardiology           Other Visit Diagnoses    None.         Return in about 4 months (around 1/7/2018).        Patient information shared at the time of visit:  Overall I think you're doing great.  Understanding that the platelets tend to run low, I think we stand a lot to gain from continuing you on your current medications - including the Pradaxa 150 mg and the aspirin at 81 mg.     Thank you very much for the opportunity to assist in this patient's care.  If you have any questions or concerns, please do not hesitate to contact me at any time.     Over 60 minutes time spent with patient, > 50% in discussion and counseling with patient regarding diagnosis, prognosis and treatment strategies    Sincerely,  Bishnu Harvey, DO

## 2017-09-07 NOTE — PATIENT INSTRUCTIONS
Overall I think you're doing great.  Understanding that the platelets tend to run low, I think we stand a lot to gain from continuing you on your current medications - including the Pradaxa 150 mg and the aspirin at 81 mg.

## 2017-11-09 ENCOUNTER — TELEPHONE (OUTPATIENT)
Dept: NEUROLOGY | Facility: CLINIC | Age: 82
End: 2017-11-09

## 2017-11-09 NOTE — TELEPHONE ENCOUNTER
----- Message from Makenna Wang sent at 11/9/2017  9:25 AM CST -----  Contact: Daughter- Nena Coleman [phone]  Patient's daughter called asking to schedule appointment for the patient for the month of January. Thanks!

## 2017-11-09 NOTE — TELEPHONE ENCOUNTER
----- Message from Nicolle Jacobo sent at 11/9/2017 12:23 PM CST -----  Contact: Nidiaenzo Elenagus (Daughter) [phone] or [phone]  Nidiaenzo Elenagus (Daughter) [phone] or [phone], returning phone call

## 2018-01-30 ENCOUNTER — PES CALL (OUTPATIENT)
Dept: ADMINISTRATIVE | Facility: CLINIC | Age: 83
End: 2018-01-30

## 2018-06-19 ENCOUNTER — PES CALL (OUTPATIENT)
Dept: ADMINISTRATIVE | Facility: CLINIC | Age: 83
End: 2018-06-19

## 2019-01-01 ENCOUNTER — PES CALL (OUTPATIENT)
Dept: ADMINISTRATIVE | Facility: CLINIC | Age: 84
End: 2019-01-01

## 2019-01-01 ENCOUNTER — LAB VISIT (OUTPATIENT)
Dept: LAB | Facility: HOSPITAL | Age: 84
End: 2019-01-01
Attending: INTERNAL MEDICINE
Payer: MEDICARE

## 2019-01-01 DIAGNOSIS — E03.9 MYXEDEMA HEART DISEASE: Primary | ICD-10-CM

## 2019-01-01 DIAGNOSIS — I48.0 PAROXYSMAL ATRIAL FIBRILLATION: ICD-10-CM

## 2019-01-01 DIAGNOSIS — I25.10 CORONARY ATHEROSCLEROSIS OF NATIVE CORONARY ARTERY: ICD-10-CM

## 2019-01-01 DIAGNOSIS — E78.5 HYPERLIPEMIA: ICD-10-CM

## 2019-01-01 DIAGNOSIS — I51.9 MYXEDEMA HEART DISEASE: Primary | ICD-10-CM

## 2019-01-01 LAB
ALBUMIN SERPL BCP-MCNC: 4 G/DL (ref 3.5–5.2)
ALP SERPL-CCNC: 43 U/L (ref 55–135)
ALT SERPL W/O P-5'-P-CCNC: 13 U/L (ref 10–44)
ANION GAP SERPL CALC-SCNC: 7 MMOL/L (ref 8–16)
AST SERPL-CCNC: 23 U/L (ref 10–40)
BASOPHILS # BLD AUTO: 0.02 K/UL (ref 0–0.2)
BASOPHILS NFR BLD: 0.2 % (ref 0–1.9)
BILIRUB SERPL-MCNC: 0.7 MG/DL (ref 0.1–1)
BUN SERPL-MCNC: 36 MG/DL (ref 10–30)
CALCIUM SERPL-MCNC: 9.4 MG/DL (ref 8.7–10.5)
CHLORIDE SERPL-SCNC: 105 MMOL/L (ref 95–110)
CHOLEST SERPL-MCNC: 151 MG/DL (ref 120–199)
CHOLEST/HDLC SERPL: 2.3 {RATIO} (ref 2–5)
CO2 SERPL-SCNC: 29 MMOL/L (ref 23–29)
CREAT SERPL-MCNC: 1.2 MG/DL (ref 0.5–1.4)
DIFFERENTIAL METHOD: ABNORMAL
EOSINOPHIL # BLD AUTO: 0.2 K/UL (ref 0–0.5)
EOSINOPHIL NFR BLD: 2.9 % (ref 0–8)
ERYTHROCYTE [DISTWIDTH] IN BLOOD BY AUTOMATED COUNT: 13.6 % (ref 11.5–14.5)
EST. GFR  (AFRICAN AMERICAN): 44.4 ML/MIN/1.73 M^2
EST. GFR  (NON AFRICAN AMERICAN): 38.5 ML/MIN/1.73 M^2
GLUCOSE SERPL-MCNC: 104 MG/DL (ref 70–110)
HCT VFR BLD AUTO: 35.1 % (ref 37–48.5)
HDLC SERPL-MCNC: 66 MG/DL (ref 40–75)
HDLC SERPL: 43.7 % (ref 20–50)
HGB BLD-MCNC: 11.3 G/DL (ref 12–16)
IMM GRANULOCYTES # BLD AUTO: 0.03 K/UL (ref 0–0.04)
IMM GRANULOCYTES NFR BLD AUTO: 0.4 % (ref 0–0.5)
LDLC SERPL CALC-MCNC: 74.2 MG/DL (ref 63–159)
LYMPHOCYTES # BLD AUTO: 2.8 K/UL (ref 1–4.8)
LYMPHOCYTES NFR BLD: 35.1 % (ref 18–48)
MCH RBC QN AUTO: 31.7 PG (ref 27–31)
MCHC RBC AUTO-ENTMCNC: 32.2 G/DL (ref 32–36)
MCV RBC AUTO: 99 FL (ref 82–98)
MONOCYTES # BLD AUTO: 0.9 K/UL (ref 0.3–1)
MONOCYTES NFR BLD: 11.6 % (ref 4–15)
NEUTROPHILS # BLD AUTO: 4 K/UL (ref 1.8–7.7)
NEUTROPHILS NFR BLD: 49.8 % (ref 38–73)
NONHDLC SERPL-MCNC: 85 MG/DL
NRBC BLD-RTO: 0 /100 WBC
PLATELET # BLD AUTO: 131 K/UL (ref 150–350)
PMV BLD AUTO: 13.5 FL (ref 9.2–12.9)
POTASSIUM SERPL-SCNC: 4.1 MMOL/L (ref 3.5–5.1)
PROT SERPL-MCNC: 6.6 G/DL (ref 6–8.4)
RBC # BLD AUTO: 3.56 M/UL (ref 4–5.4)
SODIUM SERPL-SCNC: 141 MMOL/L (ref 136–145)
TRIGL SERPL-MCNC: 54 MG/DL (ref 30–150)
TSH SERPL DL<=0.005 MIU/L-ACNC: 5.05 UIU/ML (ref 0.34–5.6)
WBC # BLD AUTO: 8.07 K/UL (ref 3.9–12.7)

## 2019-01-01 PROCEDURE — 36415 COLL VENOUS BLD VENIPUNCTURE: CPT

## 2019-01-01 PROCEDURE — 84443 ASSAY THYROID STIM HORMONE: CPT

## 2019-01-01 PROCEDURE — 85025 COMPLETE CBC W/AUTO DIFF WBC: CPT

## 2019-01-01 PROCEDURE — 80053 COMPREHEN METABOLIC PANEL: CPT

## 2019-01-01 PROCEDURE — 80061 LIPID PANEL: CPT

## 2020-01-01 ENCOUNTER — HOSPITAL ENCOUNTER (INPATIENT)
Facility: HOSPITAL | Age: 85
LOS: 1 days | DRG: 871 | End: 2020-02-07
Attending: EMERGENCY MEDICINE | Admitting: INTERNAL MEDICINE
Payer: MEDICARE

## 2020-01-01 ENCOUNTER — ANESTHESIA EVENT (OUTPATIENT)
Dept: EMERGENCY MEDICINE | Facility: HOSPITAL | Age: 85
DRG: 871 | End: 2020-01-01
Payer: MEDICARE

## 2020-01-01 ENCOUNTER — ANESTHESIA (OUTPATIENT)
Dept: EMERGENCY MEDICINE | Facility: HOSPITAL | Age: 85
DRG: 871 | End: 2020-01-01
Payer: MEDICARE

## 2020-01-01 ENCOUNTER — CLINICAL SUPPORT (OUTPATIENT)
Dept: CARDIOLOGY | Facility: HOSPITAL | Age: 85
DRG: 871 | End: 2020-01-01
Attending: INTERNAL MEDICINE
Payer: MEDICARE

## 2020-01-01 VITALS
SYSTOLIC BLOOD PRESSURE: 88 MMHG | BODY MASS INDEX: 19.49 KG/M2 | WEIGHT: 121.25 LBS | HEIGHT: 66 IN | TEMPERATURE: 98 F | OXYGEN SATURATION: 87 % | DIASTOLIC BLOOD PRESSURE: 43 MMHG

## 2020-01-01 DIAGNOSIS — K92.2 GASTROINTESTINAL HEMORRHAGE, UNSPECIFIED GASTROINTESTINAL HEMORRHAGE TYPE: Primary | ICD-10-CM

## 2020-01-01 DIAGNOSIS — R57.9 SHOCK: ICD-10-CM

## 2020-01-01 DIAGNOSIS — R57.8 HEMORRHAGIC SHOCK: ICD-10-CM

## 2020-01-01 DIAGNOSIS — J96.01 ACUTE RESPIRATORY FAILURE WITH HYPOXIA: ICD-10-CM

## 2020-01-01 DIAGNOSIS — R00.0 TACHYCARDIA: ICD-10-CM

## 2020-01-01 DIAGNOSIS — D62 ANEMIA DUE TO ACUTE BLOOD LOSS: ICD-10-CM

## 2020-01-01 DIAGNOSIS — R07.9 CHEST PAIN: ICD-10-CM

## 2020-01-01 LAB
ABO + RH BLD: NORMAL
ALBUMIN SERPL BCP-MCNC: 1.9 G/DL (ref 3.5–5.2)
ALBUMIN SERPL BCP-MCNC: 2 G/DL (ref 3.5–5.2)
ALBUMIN SERPL BCP-MCNC: 2.4 G/DL (ref 3.5–5.2)
ALLENS TEST: ABNORMAL
ALP SERPL-CCNC: 30 U/L (ref 55–135)
ALP SERPL-CCNC: 33 U/L (ref 55–135)
ALP SERPL-CCNC: 37 U/L (ref 55–135)
ALT SERPL W/O P-5'-P-CCNC: 22 U/L (ref 10–44)
ALT SERPL W/O P-5'-P-CCNC: 67 U/L (ref 10–44)
ALT SERPL W/O P-5'-P-CCNC: 74 U/L (ref 10–44)
ANION GAP SERPL CALC-SCNC: 10 MMOL/L (ref 8–16)
ANION GAP SERPL CALC-SCNC: 12 MMOL/L (ref 8–16)
ANION GAP SERPL CALC-SCNC: 15 MMOL/L (ref 8–16)
ANION GAP SERPL CALC-SCNC: 29 MMOL/L (ref 8–16)
ANISOCYTOSIS BLD QL SMEAR: SLIGHT
ANISOCYTOSIS BLD QL SMEAR: SLIGHT
AORTIC ROOT ANNULUS: 2.97 CM
AORTIC VALVE CUSP SEPERATION: 1.9 CM
APTT PPP: 30.7 SEC (ref 23.6–33.3)
APTT PPP: 30.8 SEC (ref 23.6–33.3)
APTT PPP: 37.1 SEC (ref 23.6–33.3)
AST SERPL-CCNC: 152 U/L (ref 10–40)
AST SERPL-CCNC: 180 U/L (ref 10–40)
AST SERPL-CCNC: 46 U/L (ref 10–40)
AV INDEX (PROSTH): 0.42
AV MEAN GRADIENT: 4 MMHG
AV PEAK GRADIENT: 6 MMHG
AV VALVE AREA: 0.88 CM2
AV VELOCITY RATIO: 61.31
BACTERIA #/AREA URNS HPF: ABNORMAL /HPF
BASOPHILS # BLD AUTO: 0.02 K/UL (ref 0–0.2)
BASOPHILS # BLD AUTO: 0.06 K/UL (ref 0–0.2)
BASOPHILS NFR BLD: 0 % (ref 0–1.9)
BASOPHILS NFR BLD: 0.1 % (ref 0–1.9)
BASOPHILS NFR BLD: 0.8 % (ref 0–1.9)
BILIRUB SERPL-MCNC: 0.9 MG/DL (ref 0.1–1)
BILIRUB SERPL-MCNC: 1.4 MG/DL (ref 0.1–1)
BILIRUB SERPL-MCNC: 1.5 MG/DL (ref 0.1–1)
BILIRUB UR QL STRIP: NEGATIVE
BLD GP AB SCN CELLS X3 SERPL QL: NORMAL
BLD PROD TYP BPU: NORMAL
BLOOD UNIT EXPIRATION DATE: NORMAL
BLOOD UNIT TYPE CODE: 6200
BLOOD UNIT TYPE CODE: 6200
BLOOD UNIT TYPE CODE: 9500
BLOOD UNIT TYPE CODE: 9500
BLOOD UNIT TYPE: NORMAL
BSA FOR ECHO PROCEDURE: 1.6 M2
BUN SERPL-MCNC: 61 MG/DL (ref 10–30)
BUN SERPL-MCNC: 64 MG/DL (ref 10–30)
BUN SERPL-MCNC: 69 MG/DL (ref 10–30)
BUN SERPL-MCNC: 70 MG/DL (ref 10–30)
CALCIUM SERPL-MCNC: 6.8 MG/DL (ref 8.7–10.5)
CALCIUM SERPL-MCNC: 7.6 MG/DL (ref 8.7–10.5)
CALCIUM SERPL-MCNC: 7.7 MG/DL (ref 8.7–10.5)
CALCIUM SERPL-MCNC: 8.4 MG/DL (ref 8.7–10.5)
CHLORIDE SERPL-SCNC: 102 MMOL/L (ref 95–110)
CHLORIDE SERPL-SCNC: 104 MMOL/L (ref 95–110)
CHLORIDE SERPL-SCNC: 105 MMOL/L (ref 95–110)
CHLORIDE SERPL-SCNC: 107 MMOL/L (ref 95–110)
CLARITY UR: ABNORMAL
CO2 SERPL-SCNC: 13 MMOL/L (ref 23–29)
CO2 SERPL-SCNC: 14 MMOL/L (ref 23–29)
CO2 SERPL-SCNC: 16 MMOL/L (ref 23–29)
CO2 SERPL-SCNC: 7 MMOL/L (ref 23–29)
CODING SYSTEM: NORMAL
COLOR UR: YELLOW
CREAT SERPL-MCNC: 1.8 MG/DL (ref 0.5–1.4)
CREAT SERPL-MCNC: 1.9 MG/DL (ref 0.5–1.4)
CV ECHO LV RWT: 0.61 CM
DELSYS: ABNORMAL
DIFFERENTIAL METHOD: ABNORMAL
DISPENSE STATUS: NORMAL
DOP CALC AO PEAK VEL: 1.22 M/S
DOP CALC AO VTI: 18.19 CM
DOP CALC LVOT AREA: 2.1 CM2
DOP CALC LVOT DIAMETER: 1.64 CM
DOP CALC LVOT PEAK VEL: 74.8 M/S
DOP CALC LVOT STROKE VOLUME: 16 CM3
DOP CALCLVOT PEAK VEL VTI: 7.58 CM
E WAVE DECELERATION TIME: 169.9 MSEC
E/E' RATIO: 10.57 M/S
ECHO LV POSTERIOR WALL: 0.89 CM (ref 0.6–1.1)
EOSINOPHIL # BLD AUTO: 0 K/UL (ref 0–0.5)
EOSINOPHIL # BLD AUTO: 0.1 K/UL (ref 0–0.5)
EOSINOPHIL NFR BLD: 0.2 % (ref 0–8)
EOSINOPHIL NFR BLD: 0.7 % (ref 0–8)
EOSINOPHIL NFR BLD: 1 % (ref 0–8)
ERYTHROCYTE [DISTWIDTH] IN BLOOD BY AUTOMATED COUNT: 14.5 % (ref 11.5–14.5)
ERYTHROCYTE [DISTWIDTH] IN BLOOD BY AUTOMATED COUNT: 15.7 % (ref 11.5–14.5)
ERYTHROCYTE [DISTWIDTH] IN BLOOD BY AUTOMATED COUNT: 15.7 % (ref 11.5–14.5)
ERYTHROCYTE [DISTWIDTH] IN BLOOD BY AUTOMATED COUNT: 16.1 % (ref 11.5–14.5)
ERYTHROCYTE [DISTWIDTH] IN BLOOD BY AUTOMATED COUNT: 16.6 % (ref 11.5–14.5)
ERYTHROCYTE [DISTWIDTH] IN BLOOD BY AUTOMATED COUNT: 16.6 % (ref 11.5–14.5)
ERYTHROCYTE [DISTWIDTH] IN BLOOD BY AUTOMATED COUNT: 16.8 % (ref 11.5–14.5)
ERYTHROCYTE [DISTWIDTH] IN BLOOD BY AUTOMATED COUNT: 16.8 % (ref 11.5–14.5)
ERYTHROCYTE [DISTWIDTH] IN BLOOD BY AUTOMATED COUNT: 17.1 % (ref 11.5–14.5)
ERYTHROCYTE [DISTWIDTH] IN BLOOD BY AUTOMATED COUNT: 17.3 % (ref 11.5–14.5)
ERYTHROCYTE [SEDIMENTATION RATE] IN BLOOD BY WESTERGREN METHOD: 24 MM/H
ERYTHROCYTE [SEDIMENTATION RATE] IN BLOOD BY WESTERGREN METHOD: 24 MM/H
ERYTHROCYTE [SEDIMENTATION RATE] IN BLOOD BY WESTERGREN METHOD: 3 MM/H
ERYTHROCYTE [SEDIMENTATION RATE] IN BLOOD BY WESTERGREN METHOD: 30 MM/H
EST. GFR  (AFRICAN AMERICAN): 25.5 ML/MIN/1.73 M^2
EST. GFR  (AFRICAN AMERICAN): 27.2 ML/MIN/1.73 M^2
EST. GFR  (NON AFRICAN AMERICAN): 22.1 ML/MIN/1.73 M^2
EST. GFR  (NON AFRICAN AMERICAN): 23.6 ML/MIN/1.73 M^2
ETCO2: 0
FERRITIN SERPL-MCNC: 422 NG/ML (ref 20–300)
FIO2: 0.45
FIO2: 0.5
FIO2: 1
FIO2: 45
FIO2: 45
FIO2: 50
FRACTIONAL SHORTENING: 17 % (ref 28–44)
GIANT PLATELETS BLD QL SMEAR: PRESENT
GLUCOSE SERPL-MCNC: 106 MG/DL (ref 70–110)
GLUCOSE SERPL-MCNC: 108 MG/DL (ref 70–110)
GLUCOSE SERPL-MCNC: 110 MG/DL (ref 70–110)
GLUCOSE SERPL-MCNC: 132 MG/DL (ref 70–110)
GLUCOSE SERPL-MCNC: 138 MG/DL (ref 70–110)
GLUCOSE SERPL-MCNC: 138 MG/DL (ref 70–110)
GLUCOSE SERPL-MCNC: 147 MG/DL (ref 70–110)
GLUCOSE SERPL-MCNC: 157 MG/DL (ref 70–110)
GLUCOSE SERPL-MCNC: 189 MG/DL (ref 70–110)
GLUCOSE SERPL-MCNC: 262 MG/DL (ref 70–110)
GLUCOSE SERPL-MCNC: 278 MG/DL (ref 70–110)
GLUCOSE SERPL-MCNC: 96 MG/DL (ref 70–110)
GLUCOSE UR QL STRIP: NEGATIVE
HCO3 UR-SCNC: 10.6 MMOL/L (ref 24–28)
HCO3 UR-SCNC: 11.2 MMOL/L (ref 24–28)
HCO3 UR-SCNC: 13.9 MMOL/L (ref 24–28)
HCO3 UR-SCNC: 14.1 MMOL/L (ref 24–28)
HCO3 UR-SCNC: 7.6 MMOL/L (ref 24–28)
HCO3 UR-SCNC: 8 MMOL/L (ref 24–28)
HCO3 UR-SCNC: 9.8 MMOL/L (ref 24–28)
HCO3 UR-SCNC: 9.8 MMOL/L (ref 24–28)
HCT VFR BLD AUTO: 18.2 % (ref 37–48.5)
HCT VFR BLD AUTO: 35.4 % (ref 37–48.5)
HCT VFR BLD AUTO: 35.4 % (ref 37–48.5)
HCT VFR BLD AUTO: 36.5 % (ref 37–48.5)
HCT VFR BLD AUTO: 37.8 % (ref 37–48.5)
HCT VFR BLD AUTO: 37.8 % (ref 37–48.5)
HCT VFR BLD AUTO: 38.6 % (ref 37–48.5)
HCT VFR BLD AUTO: 40.2 % (ref 37–48.5)
HCT VFR BLD AUTO: 40.2 % (ref 37–48.5)
HCT VFR BLD AUTO: 40.4 % (ref 37–48.5)
HCT VFR BLD CALC: 15 %PCV (ref 36–54)
HCT VFR BLD CALC: 15 %PCV (ref 36–54)
HCT VFR BLD CALC: 29 %PCV (ref 36–54)
HCT VFR BLD CALC: 34 %PCV (ref 36–54)
HCT VFR BLD CALC: 34 %PCV (ref 36–54)
HCT VFR BLD CALC: 37 %PCV (ref 36–54)
HCT VFR BLD CALC: 38 %PCV (ref 36–54)
HCT VFR BLD CALC: <15 %PCV (ref 36–54)
HGB BLD-MCNC: 11.9 G/DL (ref 12–16)
HGB BLD-MCNC: 11.9 G/DL (ref 12–16)
HGB BLD-MCNC: 12.8 G/DL (ref 12–16)
HGB BLD-MCNC: 13.1 G/DL (ref 12–16)
HGB BLD-MCNC: 13.1 G/DL (ref 12–16)
HGB BLD-MCNC: 13.3 G/DL (ref 12–16)
HGB BLD-MCNC: 14.2 G/DL (ref 12–16)
HGB BLD-MCNC: 5.4 G/DL (ref 12–16)
HGB UR QL STRIP: ABNORMAL
HYALINE CASTS #/AREA URNS LPF: 40 /LPF
IMM GRANULOCYTES # BLD AUTO: 0.12 K/UL (ref 0–0.04)
IMM GRANULOCYTES # BLD AUTO: 0.86 K/UL (ref 0–0.04)
IMM GRANULOCYTES # BLD AUTO: ABNORMAL K/UL (ref 0–0.04)
IMM GRANULOCYTES NFR BLD AUTO: 1.7 % (ref 0–0.5)
IMM GRANULOCYTES NFR BLD AUTO: 5.4 % (ref 0–0.5)
IMM GRANULOCYTES NFR BLD AUTO: ABNORMAL % (ref 0–0.5)
INR PPP: 1.7
INR PPP: 1.8
INR PPP: 2.2
INR PPP: 2.7
INTERVENTRICULAR SEPTUM: 1.19 CM (ref 0.6–1.1)
IRON SERPL-MCNC: 136 UG/DL (ref 30–160)
IVRT: 59.37 MSEC
KETONES UR QL STRIP: NEGATIVE
LACTATE SERPL-SCNC: 3.1 MMOL/L (ref 0.5–1.9)
LACTATE SERPL-SCNC: 6.6 MMOL/L (ref 0.5–1.9)
LEFT ATRIUM SIZE: 3.8 CM
LEFT INTERNAL DIMENSION IN SYSTOLE: 2.42 CM (ref 2.1–4)
LEFT VENTRICLE DIASTOLIC VOLUME INDEX: 11.44 ML/M2
LEFT VENTRICLE DIASTOLIC VOLUME: 18.5 ML
LEFT VENTRICLE MASS INDEX: 52 G/M2
LEFT VENTRICLE SYSTOLIC VOLUME INDEX: 5.4 ML/M2
LEFT VENTRICLE SYSTOLIC VOLUME: 8.8 ML
LEFT VENTRICULAR INTERNAL DIMENSION IN DIASTOLE: 2.91 CM (ref 3.5–6)
LEFT VENTRICULAR MASS: 83.47 G
LEUKOCYTE ESTERASE UR QL STRIP: ABNORMAL
LV LATERAL E/E' RATIO: 9.25 M/S
LV SEPTAL E/E' RATIO: 12.33 M/S
LYMPHOCYTES # BLD AUTO: 2.7 K/UL (ref 1–4.8)
LYMPHOCYTES # BLD AUTO: 3.2 K/UL (ref 1–4.8)
LYMPHOCYTES NFR BLD: 17.1 % (ref 18–48)
LYMPHOCYTES NFR BLD: 45.4 % (ref 18–48)
LYMPHOCYTES NFR BLD: 49 % (ref 18–48)
MAGNESIUM SERPL-MCNC: 1.7 MG/DL (ref 1.6–2.6)
MAGNESIUM SERPL-MCNC: 1.8 MG/DL (ref 1.6–2.6)
MAGNESIUM SERPL-MCNC: 2.3 MG/DL (ref 1.6–2.6)
MAGNESIUM SERPL-MCNC: 2.4 MG/DL (ref 1.6–2.6)
MCH RBC QN AUTO: 30.5 PG (ref 27–31)
MCH RBC QN AUTO: 30.5 PG (ref 27–31)
MCH RBC QN AUTO: 30.7 PG (ref 27–31)
MCH RBC QN AUTO: 31 PG (ref 27–31)
MCH RBC QN AUTO: 31 PG (ref 27–31)
MCH RBC QN AUTO: 31.2 PG (ref 27–31)
MCH RBC QN AUTO: 31.3 PG (ref 27–31)
MCH RBC QN AUTO: 31.4 PG (ref 27–31)
MCH RBC QN AUTO: 31.4 PG (ref 27–31)
MCH RBC QN AUTO: 32.7 PG (ref 27–31)
MCHC RBC AUTO-ENTMCNC: 29.7 G/DL (ref 32–36)
MCHC RBC AUTO-ENTMCNC: 32.6 G/DL (ref 32–36)
MCHC RBC AUTO-ENTMCNC: 32.6 G/DL (ref 32–36)
MCHC RBC AUTO-ENTMCNC: 33.6 G/DL (ref 32–36)
MCHC RBC AUTO-ENTMCNC: 33.6 G/DL (ref 32–36)
MCHC RBC AUTO-ENTMCNC: 34.5 G/DL (ref 32–36)
MCHC RBC AUTO-ENTMCNC: 35.1 G/DL (ref 32–36)
MCHC RBC AUTO-ENTMCNC: 35.1 G/DL (ref 32–36)
MCHC RBC AUTO-ENTMCNC: 35.2 G/DL (ref 32–36)
MCHC RBC AUTO-ENTMCNC: 35.2 G/DL (ref 32–36)
MCV RBC AUTO: 110 FL (ref 82–98)
MCV RBC AUTO: 89 FL (ref 82–98)
MCV RBC AUTO: 91 FL (ref 82–98)
MCV RBC AUTO: 91 FL (ref 82–98)
MCV RBC AUTO: 95 FL (ref 82–98)
MCV RBC AUTO: 95 FL (ref 82–98)
MICROSCOPIC COMMENT: ABNORMAL
MIN VOL: 12.3
MIN VOL: 12.4
MODE: ABNORMAL
MONOCYTES # BLD AUTO: 0.2 K/UL (ref 0.3–1)
MONOCYTES # BLD AUTO: 1.4 K/UL (ref 0.3–1)
MONOCYTES NFR BLD: 2.7 % (ref 4–15)
MONOCYTES NFR BLD: 4 % (ref 4–15)
MONOCYTES NFR BLD: 8.7 % (ref 4–15)
MV PEAK E VEL: 0.74 M/S
NEUTROPHILS # BLD AUTO: 10.8 K/UL (ref 1.8–7.7)
NEUTROPHILS # BLD AUTO: 3.5 K/UL (ref 1.8–7.7)
NEUTROPHILS NFR BLD: 21 % (ref 38–73)
NEUTROPHILS NFR BLD: 48.7 % (ref 38–73)
NEUTROPHILS NFR BLD: 68.5 % (ref 38–73)
NEUTS BAND NFR BLD MANUAL: 25 %
NITRITE UR QL STRIP: NEGATIVE
NRBC BLD-RTO: 20 /100 WBC
NRBC BLD-RTO: 21 /100 WBC
NRBC BLD-RTO: 4 /100 WBC
NUM UNITS TRANS FFP: NORMAL
NUM UNITS TRANS FFP: NORMAL
NUM UNITS TRANS PACKED RBC: NORMAL
NUM UNITS TRANS PACKED RBC: NORMAL
PCO2 BLDA: 22.4 MMHG (ref 35–45)
PCO2 BLDA: 27.1 MMHG (ref 35–45)
PCO2 BLDA: 27.3 MMHG (ref 35–45)
PCO2 BLDA: 29.8 MMHG (ref 35–45)
PCO2 BLDA: 32.3 MMHG (ref 35–45)
PCO2 BLDA: 32.3 MMHG (ref 35–45)
PCO2 BLDA: 44.4 MMHG (ref 35–45)
PCO2 BLDA: 44.4 MMHG (ref 35–45)
PEEP: 5
PH SMN: 6.95 [PH] (ref 7.35–7.45)
PH SMN: 6.95 [PH] (ref 7.35–7.45)
PH SMN: 7.04 [PH] (ref 7.35–7.45)
PH SMN: 7.14 [PH] (ref 7.35–7.45)
PH SMN: 7.15 [PH] (ref 7.35–7.45)
PH SMN: 7.2 [PH] (ref 7.35–7.45)
PH SMN: 7.25 [PH] (ref 7.35–7.45)
PH SMN: 7.32 [PH] (ref 7.35–7.45)
PH UR STRIP: >8 [PH] (ref 5–8)
PHOSPHATE SERPL-MCNC: 2.9 MG/DL (ref 2.7–4.5)
PHOSPHATE SERPL-MCNC: 3.1 MG/DL (ref 2.7–4.5)
PHOSPHATE SERPL-MCNC: 3.2 MG/DL (ref 2.7–4.5)
PHOSPHATE SERPL-MCNC: 7.9 MG/DL (ref 2.7–4.5)
PIP: 3
PIP: 30
PISA TR MAX VEL: 2.43 M/S
PLATELET # BLD AUTO: 124 K/UL (ref 150–350)
PLATELET # BLD AUTO: 41 K/UL (ref 150–350)
PLATELET # BLD AUTO: 46 K/UL (ref 150–350)
PLATELET # BLD AUTO: 49 K/UL (ref 150–350)
PLATELET # BLD AUTO: 49 K/UL (ref 150–350)
PLATELET # BLD AUTO: 63 K/UL (ref 150–350)
PLATELET # BLD AUTO: 63 K/UL (ref 150–350)
PLATELET # BLD AUTO: 70 K/UL (ref 150–350)
PLATELET # BLD AUTO: 80 K/UL (ref 150–350)
PLATELET # BLD AUTO: 80 K/UL (ref 150–350)
PLATELET BLD QL SMEAR: ABNORMAL
PMV BLD AUTO: 12 FL (ref 9.2–12.9)
PMV BLD AUTO: 12.5 FL (ref 9.2–12.9)
PMV BLD AUTO: 12.7 FL (ref 9.2–12.9)
PMV BLD AUTO: 12.7 FL (ref 9.2–12.9)
PMV BLD AUTO: 13.5 FL (ref 9.2–12.9)
PMV BLD AUTO: 13.5 FL (ref 9.2–12.9)
PMV BLD AUTO: 13.6 FL (ref 9.2–12.9)
PMV BLD AUTO: 13.9 FL (ref 9.2–12.9)
PO2 BLDA: 41 MMHG (ref 40–60)
PO2 BLDA: 41 MMHG (ref 80–100)
PO2 BLDA: 494 MMHG (ref 80–100)
PO2 BLDA: 68 MMHG (ref 80–100)
PO2 BLDA: 76 MMHG (ref 80–100)
PO2 BLDA: 78 MMHG (ref 80–100)
PO2 BLDA: 91 MMHG (ref 80–100)
PO2 BLDA: 97 MMHG (ref 80–100)
POC BE: -12 MMOL/L
POC BE: -13 MMOL/L
POC BE: -17 MMOL/L
POC BE: -18 MMOL/L
POC BE: -21 MMOL/L
POC BE: -22 MMOL/L
POC BE: -22 MMOL/L
POC BE: -23 MMOL/L
POC IONIZED CALCIUM: 1.01 MMOL/L (ref 1.06–1.42)
POC IONIZED CALCIUM: 1.05 MMOL/L (ref 1.06–1.42)
POC IONIZED CALCIUM: 1.05 MMOL/L (ref 1.06–1.42)
POC IONIZED CALCIUM: 1.06 MMOL/L (ref 1.06–1.42)
POC IONIZED CALCIUM: 1.09 MMOL/L (ref 1.06–1.42)
POC IONIZED CALCIUM: 1.09 MMOL/L (ref 1.06–1.42)
POC IONIZED CALCIUM: 1.22 MMOL/L (ref 1.06–1.42)
POC IONIZED CALCIUM: 1.22 MMOL/L (ref 1.06–1.42)
POC PCO2 TEMP: 27.3 MMHG
POC PH TEMP: 7.2
POC PO2 TEMP: 91 MMHG
POC SATURATED O2: 100 % (ref 95–100)
POC SATURATED O2: 48 % (ref 95–100)
POC SATURATED O2: 48 % (ref 95–100)
POC SATURATED O2: 88 % (ref 95–100)
POC SATURATED O2: 92 % (ref 95–100)
POC SATURATED O2: 93 % (ref 95–100)
POC SATURATED O2: 95 % (ref 95–100)
POC SATURATED O2: 95 % (ref 95–100)
POC TCO2: 11 MMOL/L (ref 23–27)
POC TCO2: 11 MMOL/L (ref 23–27)
POC TCO2: 11 MMOL/L (ref 24–29)
POC TCO2: 12 MMOL/L (ref 23–27)
POC TCO2: 15 MMOL/L (ref 23–27)
POC TCO2: 15 MMOL/L (ref 23–27)
POC TCO2: 8 MMOL/L (ref 23–27)
POC TCO2: 9 MMOL/L (ref 23–27)
POC TEMPERATURE: ABNORMAL
POIKILOCYTOSIS BLD QL SMEAR: SLIGHT
POIKILOCYTOSIS BLD QL SMEAR: SLIGHT
POLYCHROMASIA BLD QL SMEAR: ABNORMAL
POLYCHROMASIA BLD QL SMEAR: ABNORMAL
POTASSIUM BLD-SCNC: 2.4 MMOL/L (ref 3.5–5.1)
POTASSIUM BLD-SCNC: 3.2 MMOL/L (ref 3.5–5.1)
POTASSIUM BLD-SCNC: 3.3 MMOL/L (ref 3.5–5.1)
POTASSIUM BLD-SCNC: 3.4 MMOL/L (ref 3.5–5.1)
POTASSIUM BLD-SCNC: 3.4 MMOL/L (ref 3.5–5.1)
POTASSIUM BLD-SCNC: 3.6 MMOL/L (ref 3.5–5.1)
POTASSIUM BLD-SCNC: 3.7 MMOL/L (ref 3.5–5.1)
POTASSIUM BLD-SCNC: 3.9 MMOL/L (ref 3.5–5.1)
POTASSIUM SERPL-SCNC: 2.8 MMOL/L (ref 3.5–5.1)
POTASSIUM SERPL-SCNC: 3.1 MMOL/L (ref 3.5–5.1)
POTASSIUM SERPL-SCNC: 3.1 MMOL/L (ref 3.5–5.1)
POTASSIUM SERPL-SCNC: 3.8 MMOL/L (ref 3.5–5.1)
PROCALCITONIN SERPL IA-MCNC: 20.28 NG/ML (ref 0–0.5)
PROT SERPL-MCNC: 3.4 G/DL (ref 6–8.4)
PROT SERPL-MCNC: 3.6 G/DL (ref 6–8.4)
PROT SERPL-MCNC: 4.1 G/DL (ref 6–8.4)
PROT UR QL STRIP: ABNORMAL
PROTHROMBIN TIME: 18.9 SEC (ref 10.6–14.8)
PROTHROMBIN TIME: 20 SEC (ref 10.6–14.8)
PROTHROMBIN TIME: 23.3 SEC (ref 10.6–14.8)
PROTHROMBIN TIME: 27.5 SEC (ref 10.6–14.8)
PV PEAK VELOCITY: 158.89 CM/S
RBC # BLD AUTO: 1.65 M/UL (ref 4–5.4)
RBC # BLD AUTO: 3.9 M/UL (ref 4–5.4)
RBC # BLD AUTO: 3.9 M/UL (ref 4–5.4)
RBC # BLD AUTO: 4.1 M/UL (ref 4–5.4)
RBC # BLD AUTO: 4.23 M/UL (ref 4–5.4)
RBC # BLD AUTO: 4.23 M/UL (ref 4–5.4)
RBC # BLD AUTO: 4.24 M/UL (ref 4–5.4)
RBC # BLD AUTO: 4.24 M/UL (ref 4–5.4)
RBC # BLD AUTO: 4.33 M/UL (ref 4–5.4)
RBC # BLD AUTO: 4.53 M/UL (ref 4–5.4)
RBC #/AREA URNS HPF: >100 /HPF (ref 0–4)
RIGHT VENTRICULAR END-DIASTOLIC DIMENSION: 226 CM
SAMPLE: ABNORMAL
SATURATED IRON: 81 % (ref 20–50)
SITE: ABNORMAL
SODIUM BLD-SCNC: 122 MMOL/L (ref 136–145)
SODIUM BLD-SCNC: 126 MMOL/L (ref 136–145)
SODIUM BLD-SCNC: 129 MMOL/L (ref 136–145)
SODIUM BLD-SCNC: 135 MMOL/L (ref 136–145)
SODIUM BLD-SCNC: 135 MMOL/L (ref 136–145)
SODIUM BLD-SCNC: 139 MMOL/L (ref 136–145)
SODIUM BLD-SCNC: 139 MMOL/L (ref 136–145)
SODIUM BLD-SCNC: 143 MMOL/L (ref 136–145)
SODIUM SERPL-SCNC: 126 MMOL/L (ref 136–145)
SODIUM SERPL-SCNC: 130 MMOL/L (ref 136–145)
SODIUM SERPL-SCNC: 135 MMOL/L (ref 136–145)
SODIUM SERPL-SCNC: 143 MMOL/L (ref 136–145)
SP GR UR STRIP: 1.01 (ref 1–1.03)
SP02: 92
SP02: 98
SQUAMOUS #/AREA URNS HPF: 4 /HPF
TDI LATERAL: 0.08 M/S
TDI SEPTAL: 0.06 M/S
TDI: 0.07 M/S
TOTAL IRON BINDING CAPACITY: 167 UG/DL (ref 250–450)
TR MAX PG: 24 MMHG
TRANSFERRIN SERPL-MCNC: 119 MG/DL (ref 200–375)
TROPONIN I SERPL DL<=0.01 NG/ML-MCNC: 2.23 NG/ML
URN SPEC COLLECT METH UR: ABNORMAL
UROBILINOGEN UR STRIP-ACNC: NEGATIVE EU/DL
VANCOMYCIN SERPL-MCNC: <3.5 UG/ML
VT: 400
VT: 450
WBC # BLD AUTO: 15.8 K/UL (ref 3.9–12.7)
WBC # BLD AUTO: 7.12 K/UL (ref 3.9–12.7)
WBC # BLD AUTO: 7.12 K/UL (ref 3.9–12.7)
WBC # BLD AUTO: 8.32 K/UL (ref 3.9–12.7)
WBC # BLD AUTO: 8.32 K/UL (ref 3.9–12.7)
WBC # BLD AUTO: 8.9 K/UL (ref 3.9–12.7)
WBC # BLD AUTO: 9.47 K/UL (ref 3.9–12.7)
WBC # BLD AUTO: 9.68 K/UL (ref 3.9–12.7)
WBC # BLD AUTO: 9.68 K/UL (ref 3.9–12.7)
WBC # BLD AUTO: 9.87 K/UL (ref 3.9–12.7)
WBC #/AREA URNS HPF: >100 /HPF (ref 0–5)

## 2020-01-01 PROCEDURE — 63600175 PHARM REV CODE 636 W HCPCS: Performed by: INTERNAL MEDICINE

## 2020-01-01 PROCEDURE — C9113 INJ PANTOPRAZOLE SODIUM, VIA: HCPCS | Performed by: EMERGENCY MEDICINE

## 2020-01-01 PROCEDURE — 84484 ASSAY OF TROPONIN QUANT: CPT

## 2020-01-01 PROCEDURE — 96375 TX/PRO/DX INJ NEW DRUG ADDON: CPT

## 2020-01-01 PROCEDURE — 37799 UNLISTED PX VASCULAR SURGERY: CPT

## 2020-01-01 PROCEDURE — 83605 ASSAY OF LACTIC ACID: CPT

## 2020-01-01 PROCEDURE — 85610 PROTHROMBIN TIME: CPT | Mod: 91

## 2020-01-01 PROCEDURE — 25000003 PHARM REV CODE 250: Performed by: EMERGENCY MEDICINE

## 2020-01-01 PROCEDURE — 99900035 HC TECH TIME PER 15 MIN (STAT)

## 2020-01-01 PROCEDURE — C9113 INJ PANTOPRAZOLE SODIUM, VIA: HCPCS | Performed by: INTERNAL MEDICINE

## 2020-01-01 PROCEDURE — P9040 RBC LEUKOREDUCED IRRADIATED: HCPCS

## 2020-01-01 PROCEDURE — 85730 THROMBOPLASTIN TIME PARTIAL: CPT

## 2020-01-01 PROCEDURE — 85014 HEMATOCRIT: CPT

## 2020-01-01 PROCEDURE — 25000003 PHARM REV CODE 250

## 2020-01-01 PROCEDURE — 84295 ASSAY OF SERUM SODIUM: CPT

## 2020-01-01 PROCEDURE — 83735 ASSAY OF MAGNESIUM: CPT | Mod: 91

## 2020-01-01 PROCEDURE — 85610 PROTHROMBIN TIME: CPT

## 2020-01-01 PROCEDURE — 99291 CRITICAL CARE FIRST HOUR: CPT | Mod: ,,, | Performed by: INTERNAL MEDICINE

## 2020-01-01 PROCEDURE — 85027 COMPLETE CBC AUTOMATED: CPT

## 2020-01-01 PROCEDURE — 84145 PROCALCITONIN (PCT): CPT

## 2020-01-01 PROCEDURE — 80048 BASIC METABOLIC PNL TOTAL CA: CPT

## 2020-01-01 PROCEDURE — 82803 BLOOD GASES ANY COMBINATION: CPT

## 2020-01-01 PROCEDURE — 36430 TRANSFUSION BLD/BLD COMPNT: CPT

## 2020-01-01 PROCEDURE — 25000003 PHARM REV CODE 250: Performed by: INTERNAL MEDICINE

## 2020-01-01 PROCEDURE — 84100 ASSAY OF PHOSPHORUS: CPT | Mod: 91

## 2020-01-01 PROCEDURE — 82330 ASSAY OF CALCIUM: CPT

## 2020-01-01 PROCEDURE — 99900026 HC AIRWAY MAINTENANCE (STAT)

## 2020-01-01 PROCEDURE — 96365 THER/PROPH/DIAG IV INF INIT: CPT

## 2020-01-01 PROCEDURE — 80053 COMPREHEN METABOLIC PANEL: CPT

## 2020-01-01 PROCEDURE — 85025 COMPLETE CBC W/AUTO DIFF WBC: CPT

## 2020-01-01 PROCEDURE — 99291 PR CRITICAL CARE, E/M 30-74 MINUTES: ICD-10-PCS | Mod: ,,, | Performed by: INTERNAL MEDICINE

## 2020-01-01 PROCEDURE — 86920 COMPATIBILITY TEST SPIN: CPT

## 2020-01-01 PROCEDURE — 94761 N-INVAS EAR/PLS OXIMETRY MLT: CPT

## 2020-01-01 PROCEDURE — 20000000 HC ICU ROOM

## 2020-01-01 PROCEDURE — 63600531 PHARM REV CODE 636 NO ALT 250 W HCPCS: Mod: JG | Performed by: INTERNAL MEDICINE

## 2020-01-01 PROCEDURE — 85730 THROMBOPLASTIN TIME PARTIAL: CPT | Mod: 91

## 2020-01-01 PROCEDURE — P9016 RBC LEUKOCYTES REDUCED: HCPCS

## 2020-01-01 PROCEDURE — 27000221 HC OXYGEN, UP TO 24 HOURS

## 2020-01-01 PROCEDURE — 85027 COMPLETE CBC AUTOMATED: CPT | Mod: 91

## 2020-01-01 PROCEDURE — 94770 HC EXHALED C02 TEST: CPT

## 2020-01-01 PROCEDURE — 87070 CULTURE OTHR SPECIMN AEROBIC: CPT

## 2020-01-01 PROCEDURE — 36556 INSERT NON-TUNNEL CV CATH: CPT

## 2020-01-01 PROCEDURE — 80053 COMPREHEN METABOLIC PANEL: CPT | Mod: 91

## 2020-01-01 PROCEDURE — P9017 PLASMA 1 DONOR FRZ W/IN 8 HR: HCPCS

## 2020-01-01 PROCEDURE — 84132 ASSAY OF SERUM POTASSIUM: CPT

## 2020-01-01 PROCEDURE — 93005 ELECTROCARDIOGRAM TRACING: CPT

## 2020-01-01 PROCEDURE — 85007 BL SMEAR W/DIFF WBC COUNT: CPT

## 2020-01-01 PROCEDURE — 36620 INSERTION CATHETER ARTERY: CPT | Performed by: ANESTHESIOLOGY

## 2020-01-01 PROCEDURE — 99291 CRITICAL CARE FIRST HOUR: CPT

## 2020-01-01 PROCEDURE — 82728 ASSAY OF FERRITIN: CPT

## 2020-01-01 PROCEDURE — 31500 INSERT EMERGENCY AIRWAY: CPT

## 2020-01-01 PROCEDURE — 84100 ASSAY OF PHOSPHORUS: CPT

## 2020-01-01 PROCEDURE — 81001 URINALYSIS AUTO W/SCOPE: CPT

## 2020-01-01 PROCEDURE — 63600175 PHARM REV CODE 636 W HCPCS

## 2020-01-01 PROCEDURE — 87077 CULTURE AEROBIC IDENTIFY: CPT

## 2020-01-01 PROCEDURE — 87147 CULTURE TYPE IMMUNOLOGIC: CPT

## 2020-01-01 PROCEDURE — 83735 ASSAY OF MAGNESIUM: CPT

## 2020-01-01 PROCEDURE — 93306 TTE W/DOPPLER COMPLETE: CPT

## 2020-01-01 PROCEDURE — 43235 EGD DIAGNOSTIC BRUSH WASH: CPT | Performed by: INTERNAL MEDICINE

## 2020-01-01 PROCEDURE — 87186 SC STD MICRODIL/AGAR DIL: CPT

## 2020-01-01 PROCEDURE — 86850 RBC ANTIBODY SCREEN: CPT

## 2020-01-01 PROCEDURE — C9132 KCENTRA, PER I.U.: HCPCS | Mod: JG | Performed by: INTERNAL MEDICINE

## 2020-01-01 PROCEDURE — 94003 VENT MGMT INPAT SUBQ DAY: CPT

## 2020-01-01 PROCEDURE — 83540 ASSAY OF IRON: CPT

## 2020-01-01 PROCEDURE — 87086 URINE CULTURE/COLONY COUNT: CPT

## 2020-01-01 PROCEDURE — 63600175 PHARM REV CODE 636 W HCPCS: Performed by: EMERGENCY MEDICINE

## 2020-01-01 PROCEDURE — 80202 ASSAY OF VANCOMYCIN: CPT

## 2020-01-01 PROCEDURE — 94002 VENT MGMT INPAT INIT DAY: CPT

## 2020-01-01 RX ORDER — POTASSIUM CHLORIDE 20 MEQ/1
40 TABLET, EXTENDED RELEASE ORAL
Status: DISCONTINUED | OUTPATIENT
Start: 2020-01-01 | End: 2020-01-01 | Stop reason: HOSPADM

## 2020-01-01 RX ORDER — VANCOMYCIN HCL IN 5 % DEXTROSE 1G/250ML
1000 PLASTIC BAG, INJECTION (ML) INTRAVENOUS ONCE
Status: DISCONTINUED | OUTPATIENT
Start: 2020-01-01 | End: 2020-01-01 | Stop reason: HOSPADM

## 2020-01-01 RX ORDER — FENTANYL CITRATE 50 UG/ML
50 INJECTION, SOLUTION INTRAMUSCULAR; INTRAVENOUS
Status: DISCONTINUED | OUTPATIENT
Start: 2020-01-01 | End: 2020-01-01 | Stop reason: HOSPADM

## 2020-01-01 RX ORDER — VASOPRESSIN 20 [USP'U]/ML
INJECTION, SOLUTION INTRAMUSCULAR; SUBCUTANEOUS
Status: DISPENSED
Start: 2020-01-01 | End: 2020-01-01

## 2020-01-01 RX ORDER — POTASSIUM CHLORIDE 20 MEQ/1
20 TABLET, EXTENDED RELEASE ORAL
Status: DISCONTINUED | OUTPATIENT
Start: 2020-01-01 | End: 2020-01-01 | Stop reason: HOSPADM

## 2020-01-01 RX ORDER — POTASSIUM CHLORIDE 7.45 MG/ML
20 INJECTION INTRAVENOUS
Status: DISCONTINUED | OUTPATIENT
Start: 2020-01-01 | End: 2020-01-01 | Stop reason: HOSPADM

## 2020-01-01 RX ORDER — MORPHINE SULFATE 2 MG/ML
2 INJECTION, SOLUTION INTRAMUSCULAR; INTRAVENOUS EVERY 4 HOURS PRN
Status: DISCONTINUED | OUTPATIENT
Start: 2020-01-01 | End: 2020-01-01 | Stop reason: HOSPADM

## 2020-01-01 RX ORDER — NALOXONE HCL 0.4 MG/ML
0.4 VIAL (ML) INJECTION
Status: DISCONTINUED | OUTPATIENT
Start: 2020-01-01 | End: 2020-01-01 | Stop reason: HOSPADM

## 2020-01-01 RX ORDER — SODIUM CHLORIDE 9 MG/ML
INJECTION, SOLUTION INTRAVENOUS CONTINUOUS
Status: DISCONTINUED | OUTPATIENT
Start: 2020-01-01 | End: 2020-01-01 | Stop reason: HOSPADM

## 2020-01-01 RX ORDER — MAGNESIUM SULFATE 1 G/100ML
1 INJECTION INTRAVENOUS
Status: DISCONTINUED | OUTPATIENT
Start: 2020-01-01 | End: 2020-01-01 | Stop reason: HOSPADM

## 2020-01-01 RX ORDER — MAGNESIUM SULFATE HEPTAHYDRATE 40 MG/ML
2 INJECTION, SOLUTION INTRAVENOUS
Status: DISCONTINUED | OUTPATIENT
Start: 2020-01-01 | End: 2020-01-01 | Stop reason: HOSPADM

## 2020-01-01 RX ORDER — HYDROCODONE BITARTRATE AND ACETAMINOPHEN 500; 5 MG/1; MG/1
TABLET ORAL
Status: DISCONTINUED | OUTPATIENT
Start: 2020-01-01 | End: 2020-01-01 | Stop reason: HOSPADM

## 2020-01-01 RX ORDER — PANTOPRAZOLE SODIUM 40 MG/10ML
40 INJECTION, POWDER, LYOPHILIZED, FOR SOLUTION INTRAVENOUS
Status: ACTIVE | OUTPATIENT
Start: 2020-01-01 | End: 2020-01-01

## 2020-01-01 RX ORDER — ATORVASTATIN CALCIUM 40 MG/1
40 TABLET, FILM COATED ORAL DAILY
COMMUNITY
Start: 2019-01-01

## 2020-01-01 RX ORDER — POTASSIUM CHLORIDE 7.45 MG/ML
40 INJECTION INTRAVENOUS
Status: DISCONTINUED | OUTPATIENT
Start: 2020-01-01 | End: 2020-01-01 | Stop reason: HOSPADM

## 2020-01-01 RX ORDER — PHENYLEPHRINE HYDROCHLORIDE 10 MG/ML
INJECTION INTRAVENOUS
Status: COMPLETED
Start: 2020-01-01 | End: 2020-01-01

## 2020-01-01 RX ORDER — NALOXONE HCL 0.4 MG/ML
VIAL (ML) INJECTION
Status: COMPLETED
Start: 2020-01-01 | End: 2020-01-01

## 2020-01-01 RX ORDER — MAGNESIUM SULFATE HEPTAHYDRATE 40 MG/ML
4 INJECTION, SOLUTION INTRAVENOUS
Status: DISCONTINUED | OUTPATIENT
Start: 2020-01-01 | End: 2020-01-01 | Stop reason: HOSPADM

## 2020-01-01 RX ORDER — CYANOCOBALAMIN 1000 UG/ML
1000 INJECTION, SOLUTION INTRAMUSCULAR; SUBCUTANEOUS
COMMUNITY

## 2020-01-01 RX ORDER — IBUPROFEN 200 MG
16 TABLET ORAL
Status: DISCONTINUED | OUTPATIENT
Start: 2020-01-01 | End: 2020-01-01 | Stop reason: HOSPADM

## 2020-01-01 RX ORDER — CHLORHEXIDINE GLUCONATE ORAL RINSE 1.2 MG/ML
15 SOLUTION DENTAL 2 TIMES DAILY
Status: DISCONTINUED | OUTPATIENT
Start: 2020-01-01 | End: 2020-01-01 | Stop reason: HOSPADM

## 2020-01-01 RX ORDER — VANCOMYCIN HCL IN 5 % DEXTROSE 1G/250ML
1000 PLASTIC BAG, INJECTION (ML) INTRAVENOUS
Status: DISCONTINUED | OUTPATIENT
Start: 2020-01-01 | End: 2020-01-01

## 2020-01-01 RX ORDER — LEVOFLOXACIN 5 MG/ML
750 INJECTION, SOLUTION INTRAVENOUS
Status: DISCONTINUED | OUTPATIENT
Start: 2020-01-01 | End: 2020-01-01

## 2020-01-01 RX ORDER — IBUPROFEN 200 MG
24 TABLET ORAL
Status: DISCONTINUED | OUTPATIENT
Start: 2020-01-01 | End: 2020-01-01 | Stop reason: HOSPADM

## 2020-01-01 RX ORDER — PANTOPRAZOLE SODIUM 40 MG/10ML
40 INJECTION, POWDER, LYOPHILIZED, FOR SOLUTION INTRAVENOUS DAILY
Status: DISCONTINUED | OUTPATIENT
Start: 2020-01-01 | End: 2020-01-01 | Stop reason: HOSPADM

## 2020-01-01 RX ORDER — MUPIROCIN 20 MG/G
OINTMENT TOPICAL 2 TIMES DAILY
Status: DISCONTINUED | OUTPATIENT
Start: 2020-01-01 | End: 2020-01-01 | Stop reason: HOSPADM

## 2020-01-01 RX ORDER — FENTANYL CITRATE 50 UG/ML
50 INJECTION, SOLUTION INTRAMUSCULAR; INTRAVENOUS
Status: DISCONTINUED | OUTPATIENT
Start: 2020-02-08 | End: 2020-01-01 | Stop reason: HOSPADM

## 2020-01-01 RX ORDER — SODIUM CHLORIDE 0.9 % (FLUSH) 0.9 %
10 SYRINGE (ML) INJECTION
Status: DISCONTINUED | OUTPATIENT
Start: 2020-01-01 | End: 2020-01-01 | Stop reason: HOSPADM

## 2020-01-01 RX ORDER — GLUCAGON 1 MG
1 KIT INJECTION
Status: DISCONTINUED | OUTPATIENT
Start: 2020-01-01 | End: 2020-01-01 | Stop reason: HOSPADM

## 2020-01-01 RX ORDER — LANOLIN ALCOHOL/MO/W.PET/CERES
800 CREAM (GRAM) TOPICAL
Status: DISCONTINUED | OUTPATIENT
Start: 2020-01-01 | End: 2020-01-01 | Stop reason: HOSPADM

## 2020-01-01 RX ADMIN — FENTANYL CITRATE 50 MCG: 50 INJECTION INTRAMUSCULAR; INTRAVENOUS at 12:02

## 2020-01-01 RX ADMIN — VASOPRESSIN 0.04 UNITS/MIN: 20 INJECTION INTRAVENOUS at 12:02

## 2020-01-01 RX ADMIN — MORPHINE SULFATE 2 MG: 2 INJECTION, SOLUTION INTRAMUSCULAR; INTRAVENOUS at 01:02

## 2020-01-01 RX ADMIN — NALOXONE HYDROCHLORIDE 0.4 MG/HR: 0.4 INJECTION, SOLUTION INTRAMUSCULAR; INTRAVENOUS; SUBCUTANEOUS at 04:02

## 2020-01-01 RX ADMIN — NALOXONE HYDROCHLORIDE 0.4 MG/HR: 0.4 INJECTION, SOLUTION INTRAMUSCULAR; INTRAVENOUS; SUBCUTANEOUS at 12:02

## 2020-01-01 RX ADMIN — PIPERACILLIN AND TAZOBACTAM 4.5 G: 4; .5 INJECTION, POWDER, LYOPHILIZED, FOR SOLUTION INTRAVENOUS; PARENTERAL at 04:02

## 2020-01-01 RX ADMIN — MAGNESIUM SULFATE 2 G: 2 INJECTION INTRAVENOUS at 09:02

## 2020-01-01 RX ADMIN — SODIUM BICARBONATE: 84 INJECTION, SOLUTION INTRAVENOUS at 12:02

## 2020-01-01 RX ADMIN — MUPIROCIN: 20 OINTMENT TOPICAL at 10:02

## 2020-01-01 RX ADMIN — POTASSIUM CHLORIDE 20 MEQ: 7.46 INJECTION, SOLUTION INTRAVENOUS at 07:02

## 2020-01-01 RX ADMIN — NALOXONE HYDROCHLORIDE 0.4 MG: 0.4 INJECTION, SOLUTION INTRAMUSCULAR; INTRAVENOUS; SUBCUTANEOUS at 11:02

## 2020-01-01 RX ADMIN — CHLORHEXIDINE GLUCONATE 15 ML: 1.2 RINSE ORAL at 09:02

## 2020-01-01 RX ADMIN — PIPERACILLIN AND TAZOBACTAM 4.5 G: 4; .5 INJECTION, POWDER, LYOPHILIZED, FOR SOLUTION INTRAVENOUS; PARENTERAL at 05:02

## 2020-01-01 RX ADMIN — PHENYLEPHRINE HYDROCHLORIDE 0.5 MCG/KG/MIN: 10 INJECTION INTRAVENOUS at 11:02

## 2020-01-01 RX ADMIN — DEXTROSE 0.3 MCG/KG/MIN: 5 SOLUTION INTRAVENOUS at 06:02

## 2020-01-01 RX ADMIN — DEXTROSE 2.5 MCG/KG/MIN: 5 SOLUTION INTRAVENOUS at 08:02

## 2020-01-01 RX ADMIN — MAGNESIUM SULFATE 2 G: 2 INJECTION INTRAVENOUS at 06:02

## 2020-01-01 RX ADMIN — DEXTROSE 0.02 MCG/KG/MIN: 5 SOLUTION INTRAVENOUS at 10:02

## 2020-01-01 RX ADMIN — CALCIUM CHLORIDE 1 G: 100 INJECTION, SOLUTION INTRAVENOUS at 12:02

## 2020-01-01 RX ADMIN — PHENYLEPHRINE HYDROCHLORIDE 5.5 MCG/KG/MIN: 10 INJECTION INTRAVENOUS at 04:02

## 2020-01-01 RX ADMIN — DEXTROSE 8 MG/HR: 50 INJECTION, SOLUTION INTRAVENOUS at 02:02

## 2020-01-01 RX ADMIN — DEXTROSE 2.48 MCG/KG/MIN: 5 SOLUTION INTRAVENOUS at 02:02

## 2020-01-01 RX ADMIN — PANTOPRAZOLE SODIUM 40 MG: 40 INJECTION, POWDER, FOR SOLUTION INTRAVENOUS at 01:02

## 2020-01-01 RX ADMIN — DEXTROSE 8 MG/HR: 50 INJECTION, SOLUTION INTRAVENOUS at 11:02

## 2020-01-01 RX ADMIN — DEXTROSE 3 MCG/KG/MIN: 5 SOLUTION INTRAVENOUS at 11:02

## 2020-01-01 RX ADMIN — PHENYLEPHRINE HYDROCHLORIDE 10 MCG/KG/MIN: 10 INJECTION INTRAVENOUS at 12:02

## 2020-01-01 RX ADMIN — SODIUM CHLORIDE, SODIUM LACTATE, POTASSIUM CHLORIDE, AND CALCIUM CHLORIDE 1000 ML: .6; .31; .03; .02 INJECTION, SOLUTION INTRAVENOUS at 04:02

## 2020-01-01 RX ADMIN — MUPIROCIN: 20 OINTMENT TOPICAL at 09:02

## 2020-01-01 RX ADMIN — POTASSIUM CHLORIDE 20 MEQ: 7.46 INJECTION, SOLUTION INTRAVENOUS at 05:02

## 2020-01-01 RX ADMIN — DEXTROSE 3 MCG/KG/MIN: 5 SOLUTION INTRAVENOUS at 12:02

## 2020-01-01 RX ADMIN — PROTHROMBIN, COAGULATION FACTOR VII HUMAN, COAGULATION FACTOR IX HUMAN, COAGULATION FACTOR X HUMAN, PROTEIN C, PROTEIN S HUMAN, AND WATER 1250 UNITS: KIT at 11:02

## 2020-01-01 RX ADMIN — SODIUM CHLORIDE: 0.9 INJECTION, SOLUTION INTRAVENOUS at 12:02

## 2020-01-01 RX ADMIN — CHLORHEXIDINE GLUCONATE 15 ML: 1.2 RINSE ORAL at 10:02

## 2020-01-01 RX ADMIN — DEXTROSE 0.01 MCG/KG/MIN: 5 SOLUTION INTRAVENOUS at 09:02

## 2020-01-01 RX ADMIN — DEXTROSE 1.5 MCG/KG/MIN: 5 SOLUTION INTRAVENOUS at 03:02

## 2020-01-01 RX ADMIN — SODIUM CHLORIDE, SODIUM LACTATE, POTASSIUM CHLORIDE, AND CALCIUM CHLORIDE 500 ML: .6; .31; .03; .02 INJECTION, SOLUTION INTRAVENOUS at 09:02

## 2020-01-01 RX ADMIN — DEXTROSE 8 MG/HR: 50 INJECTION, SOLUTION INTRAVENOUS at 07:02

## 2020-01-01 RX ADMIN — DEXTROSE 2.8 MCG/KG/MIN: 5 SOLUTION INTRAVENOUS at 06:02

## 2020-01-01 RX ADMIN — SODIUM CHLORIDE 1000 ML: 9 INJECTION, SOLUTION INTRAVENOUS at 12:02

## 2020-01-01 RX ADMIN — DEXTROSE 2.5 MCG/KG/MIN: 5 SOLUTION INTRAVENOUS at 12:02

## 2020-01-01 RX ADMIN — MORPHINE SULFATE 2 MG: 2 INJECTION, SOLUTION INTRAMUSCULAR; INTRAVENOUS at 02:02

## 2020-02-06 PROBLEM — N30.90 CYSTITIS: Status: ACTIVE | Noted: 2020-01-01

## 2020-02-06 PROBLEM — Z79.01 ON ANTICOAGULANT THERAPY: Status: ACTIVE | Noted: 2020-01-01

## 2020-02-06 PROBLEM — E87.6 HYPOKALEMIA: Status: ACTIVE | Noted: 2020-01-01

## 2020-02-06 PROBLEM — D62 ACUTE BLOOD LOSS ANEMIA: Status: ACTIVE | Noted: 2020-01-01

## 2020-02-06 PROBLEM — R79.89 ELEVATED LACTIC ACID LEVEL: Status: ACTIVE | Noted: 2020-01-01

## 2020-02-06 PROBLEM — G93.41 ENCEPHALOPATHY, METABOLIC: Status: ACTIVE | Noted: 2020-01-01

## 2020-02-06 PROBLEM — R57.8 HEMORRHAGIC SHOCK: Status: ACTIVE | Noted: 2020-01-01

## 2020-02-06 PROBLEM — D69.59 THROMBOCYTOPENIA DUE TO BLOOD LOSS: Status: ACTIVE | Noted: 2020-01-01

## 2020-02-06 PROBLEM — Z86.73 HISTORY OF EMBOLIC STROKE: Status: ACTIVE | Noted: 2020-01-01

## 2020-02-06 NOTE — H&P
.  Hospital Medicine H&P    Date of Admit: 2/6/2020  Date of Transfer: 2/6/2020    Subjective:      History of Present Illness / Brief Hospital Course:  Sravani Beck is a 89 y.o. female with hx of CAD s/p CABG, PAF w/ hx of embolic stroke on chronic anticoagulation with xarelto, HTN, who presented to the ED via EMS.   Pt is currently intubated; hx obtained from chart review and family at bedside.   Per her son, she has had n/v and loose stools for the past several days. He went to check on her and found her unresponsive, lying on the ground.   EMS reported that on their arrival she was severely hypotensive with SBP in the 60s and 70s and tachycardic, in and out of afib. She was also markedly bradypneic with an undetectable SpO2 on arrival and was intubated. Around the time of her intubation, she had coffee-ground emesis. After an NGT was placed, she a large volume of coffee ground gastric aspirate. She had central venous access placed and was given crystalloid and RBCs as well as started on vasopressors for persistent hypotension.       Past Medical History:  Past Medical History:   Diagnosis Date    AF (paroxysmal atrial fibrillation)     CAD (coronary artery disease) 1970    CABG x 4    HTN (hypertension), benign     Hyperlipidemia LDL goal <70        Past Surgical History:  Past Surgical History:   Procedure Laterality Date    CARDIOVERSION  2/05 & 10/05    CHOLECYSTECTOMY      CORONARY ARTERY BYPASS GRAFT  1994    x 3    HYSTERECTOMY         Allergies:  Review of patient's allergies indicates:   Allergen Reactions    No known drug allergies        Home Medications:  Prior to Admission medications    Medication Sig Start Date End Date Taking? Authorizing Provider   atorvastatin (LIPITOR) 40 MG tablet Take 40 mg by mouth once daily.  11/27/19   Historical Provider, MD   cyanocobalamin 1,000 mcg/mL injection Inject 1,000 mcg into the skin every 3 (three) months.    Historical Provider, MD   lisinopril 10  "MG tablet Take 1 tablet (10 mg total) by mouth once daily. 14   ANETA Marin   rivaroxaban (XARELTO) 15 mg Tab Take 15 mg by mouth daily with dinner or evening meal.    Historical Provider, MD   aspirin 81 MG Chew Take 1 tablet (81 mg total) by mouth once daily. 17  Ben White MD   atorvastatin (LIPITOR) 40 MG tablet Take 1 tablet (40 mg total) by mouth once daily. 17  Crista Martínez PA-C   glucosamine sulfate 2KCl 1,000 mg Tab Every day 11  Historical Provider, MD   metoprolol tartrate (LOPRESSOR) 25 MG tablet Take 25 mg by mouth 2 (two) times daily.  9/30/15 2/6/20  Historical Provider, MD       Family History:  Family History   Problem Relation Age of Onset    Cerebral aneurysm Mother     Cancer Neg Hx        Social History:  Social History     Tobacco Use    Smoking status: Never Smoker    Smokeless tobacco: Never Used   Substance Use Topics    Alcohol use: Yes     Alcohol/week: 7.0 standard drinks     Types: 7 Glasses of wine per week    Drug use: No       Review of Systems:  Unable to obtain: intubated.      Objective:   Last 24 Hour Vital Signs:  BP  Min: 49/26  Max: 147/118  Temp  Av.1 °F (36.7 °C)  Min: 98 °F (36.7 °C)  Max: 98.2 °F (36.8 °C)  Pulse  Av.6  Min: 97  Max: 197  Resp  Av.6  Min: 22  Max: 34  SpO2  Av.1 %  Min: 75 %  Max: 98 %  Height  Av' 6" (167.6 cm)  Min: 5' 6" (167.6 cm)  Max: 5' 6" (167.6 cm)  Weight  Av.9 kg (110 lb)  Min: 49.9 kg (110 lb)  Max: 49.9 kg (110 lb)  Body mass index is 17.75 kg/m².  No intake/output data recorded.    Physical Examination:  Physical Exam   Constitutional:   Elderly, thin female   HENT:   Dried blood on lips   GT and ETT in place     Eyes: Pupils are equal, round, and reactive to light. No scleral icterus.   Neck: Neck supple. No JVD present.   Cardiovascular:   irr irr, tachy  Thready distal pulses   Pulmonary/Chest:   Intubated, on ventilator  +rhonchi "   Abdominal: Soft. There is no rebound and no guarding.   Musculoskeletal: She exhibits no edema.   Neurological:   Intubated  Moving all ext   Skin: Capillary refill takes more than 3 seconds. There is pallor.         Laboratory:  Most Recent Data:  CBC:   Lab Results   Component Value Date    WBC 9.68 02/06/2020    WBC 9.68 02/06/2020    HGB 13.1 02/06/2020    HGB 13.1 02/06/2020    HCT 40.2 02/06/2020    HCT 40.2 02/06/2020    PLT 80 (L) 02/06/2020    PLT 80 (L) 02/06/2020    MCV 95 02/06/2020    MCV 95 02/06/2020    RDW 15.7 (H) 02/06/2020    RDW 15.7 (H) 02/06/2020       BMP:   Lab Results   Component Value Date     (L) 02/06/2020    K 2.8 (LL) 02/06/2020     02/06/2020    CO2 16 (L) 02/06/2020    BUN 69 (H) 02/06/2020    GLU 96 02/06/2020    CALCIUM 7.7 (L) 02/06/2020    MG 1.7 02/06/2020    PHOS 3.2 02/06/2020     LFTs:   Lab Results   Component Value Date    PROT 4.1 (L) 02/06/2020    ALBUMIN 2.4 (L) 02/06/2020    BILITOT 0.9 02/06/2020    AST 46 (H) 02/06/2020    ALKPHOS 30 (L) 02/06/2020    ALT 22 02/06/2020     Coags:   Lab Results   Component Value Date    INR 1.8 02/06/2020     FLP:   Lab Results   Component Value Date    CHOL 151 12/27/2019    HDL 66 12/27/2019    LDLCALC 74.2 12/27/2019    TRIG 54 12/27/2019    CHOLHDL 43.7 12/27/2019     DM:   Lab Results   Component Value Date    HGBA1C 5.2 08/11/2017    LDLCALC 74.2 12/27/2019    CREATININE 1.9 (H) 02/06/2020     Thyroid:   Lab Results   Component Value Date    TSH 5.050 12/27/2019    FREET4 0.92 10/08/2010     Anemia:   Lab Results   Component Value Date    IRON 136 02/06/2020    TIBC 167 (L) 02/06/2020    FERRITIN 422 (H) 02/06/2020    EMJTGVTI53 >2000 (H) 06/17/2014    FOLATE 14.9 06/17/2014     Urinalysis:   Lab Results   Component Value Date    LABURIN  07/03/2007     >100,000 ORGANISMS/ML -ESCHERICHIA COLI  Amikacin             <=16       SENSITIVE     Amox/K Clav          <=8/4      SENSITIVE     Ceftriaxone          <=8         SENSITIVE     Cefazolin            <=8        SENSITIVE     Ciprofloxacin        <=1        SENSITIVE     Nitrofurantoin       <=32       SENSITIVE     Gentamicin           <=4        SENSITIVE     Bactrim              <=2/38     SENSITIVE     Tetracycline         <=4        SENSITIVE     Timentin             <=16       SENSITIVE     Tobramycin           <=4        SENSITIVE         LABURIN NO OTHER SIGNIFICANT ISOLATE. 07/03/2007    COLORU Yellow 02/06/2020    SPECGRAV 1.015 02/06/2020    NITRITE Negative 02/06/2020    KETONESU Negative 02/06/2020    UROBILINOGEN Negative 02/06/2020    WBCUA >100 (H) 02/06/2020       Trended Lab Data:  Recent Labs   Lab 02/06/20  0945  02/06/20  1205 02/06/20  1439 02/06/20  1538   WBC 15.80*  --   --   --  9.68  9.68   HGB 5.4*  --   --   --  13.1  13.1   HCT 18.2*   < > 29* 37 40.2  40.2   *  --   --   --  80*  80*   *  --   --   --  95  95   RDW 14.5  --   --   --  15.7*  15.7*     --   --   --  135*   K 3.1*  --   --   --  2.8*     --   --   --  104   CO2 7*  --   --   --  16*   BUN 70*  --   --   --  69*   *  --   --   --  96   CALCIUM 8.4*  --   --   --  7.7*   PROT 4.1*  --   --   --   --    ALBUMIN 2.4*  --   --   --   --    BILITOT 0.9  --   --   --   --    AST 46*  --   --   --   --    ALKPHOS 30*  --   --   --   --    ALT 22  --   --   --   --     < > = values in this interval not displayed.       Microbiology Data:  Cultures drawn in ED, pending    Other Results:  EKG (my interpretation): afib w/ RVR and nonspecific ST-T changes     Radiology:  Imaging Results          X-Ray Chest 1 View (Final result)  Result time 02/06/20 11:56:21    Final result by Fredi Bass MD (02/06/20 11:56:21)                 Impression:      1. Tiny right apical pneumothorax.  2. Support tubes and lines as above.  3. Diffuse right lung perihilar patchy alveolar and interstitial opacities either reflecting unilateral pulmonary edema or infectious or  inflammatory pneumonia.  Significant Alert: Tiny right apical pneumothorax    The significant finding above was relayed by myself  by telephone to Dr. Ahuja on 2/6/2020 at 11:53.    SIGNIFICANT FINDINGS:    RECOMMENDATIONS:      Electronically signed by: Fredi Bass MD  Date:    02/06/2020  Time:    11:56             Narrative:    EXAMINATION:  XR CHEST 1 VIEW    CLINICAL HISTORY:  Sepsis;    FINDINGS:  Portable chest at 1121 compared with 11/24/2017 shows presence of endotracheal tube with tip 3 cm proximal to arianne.  Enteric catheter has tip proximal to GE junction, proximal side port 12 cm cranial to the GE junction.  Right IJ central venous catheter tip lies in SVC.    Cardiomediastinal silhouette is otherwise normal with postsurgical changes of CABG.    Patchy perihilar alveolar opacities occur in the right lung, with associated mild right lung diffuse interstitial opacities.  Tiny right apical pneumothorax is present.  Left lung is clear.  No acute osseous abnormality.                                 Assessment/Plan:     Sravani Beck is a 89 y.o. female with:      GI hemorrhage  Hemorrhagic shock    - currently receiving RBCs   - 2 add'l units of RBCs ordered, and blood bank informed to keep 2U ahead    - Will also give 2 U FFP   - K-centra ordered    - xarelto held   - PPI infusion   - GI performed EGD emergently this afternoon, largely unremarkable   - continue pressor support as needed     Acute hypoxemic respiratory failure    - currently intubated on vent - mgmt per pulmonary    AGMA    - multifactorial    Chronic anticoagulation therapy with xarelto     - xarelto on hold     Afib with RVR    - treat underlying hypotension as above    SHERITA      - 2/2 hypovolemia/shock     - send UA     Aspiration / UTI     - cover with zosyn for now    - follow cultures     Hypokalemia     - replace    ASCVD, CAD s/p CABGx4, hx R MCA embolic stroke     - po meds held       Hx HTN             LILIA MARTINEZ  MD [place]

## 2020-02-06 NOTE — ED NOTES
Bed: 02A Trauma  Expected date:   Expected time:   Means of arrival:   Comments:  Ems unrespponsive

## 2020-02-06 NOTE — ED PROVIDER NOTES
Encounter Date: 2/6/2020       History     Chief Complaint   Patient presents with    Weakness     pt found unresponsive by family. awake, alert.     Patient here via EMS with reported history of nausea vomiting diarrhea over last 3 days son reportedly checked on patient before he went to work found her resting in bed B when the caretaker arrived at the home patient was found on the ground unresponsive at EMS arrival patient's pressure was 70 lb she tachycardic in out of atrial fibrillation with rapid ventricular response they are unable to obtain a pulse oximetry in the field however arrival emergency department patient's oxygen saturation was 75 her initial respiratory rate was agonal at 5 breaths per minute patient was intubated arrival upon and intubation patient was noted to be actively vomiting coffee-ground emesis after NG tube was placed approximately 1000 cc of coffee-ground emesis was found in patient's stomach large bore IVs were placed and fluid resuscitation was begun patient was also placed on Levophed no further history available at this time        Review of patient's allergies indicates:   Allergen Reactions    No known drug allergies      Past Medical History:   Diagnosis Date    AF (paroxysmal atrial fibrillation)     CAD (coronary artery disease) 1970    CABG x 4    HTN (hypertension), benign     Hyperlipidemia LDL goal <70      Past Surgical History:   Procedure Laterality Date    CARDIOVERSION  2/05 & 10/05    CHOLECYSTECTOMY      CORONARY ARTERY BYPASS GRAFT  1994    x 3    HYSTERECTOMY       Family History   Problem Relation Age of Onset    Cerebral aneurysm Mother     Cancer Neg Hx      Social History     Tobacco Use    Smoking status: Never Smoker    Smokeless tobacco: Never Used   Substance Use Topics    Alcohol use: Yes     Alcohol/week: 7.0 standard drinks     Types: 7 Glasses of wine per week    Drug use: No     Review of Systems   Unable to perform ROS: Severe  respiratory distress       Physical Exam     Initial Vitals   BP Pulse Resp Temp SpO2   02/06/20 0926 02/06/20 0905 02/06/20 0905 -- 02/06/20 1015   (!) 49/26 (!) 142 (!) 22  (!) 76 %      MAP       --                Physical Exam    Constitutional: She appears cachectic. She appears ill. She appears distressed.   HENT:   Head: Normocephalic and atraumatic.   Right Ear: External ear normal.   Left Ear: External ear normal.   Coffee-ground emesis in posterior pharynx   Eyes: Conjunctivae and EOM are normal. Pupils are equal, round, and reactive to light.   Neck: Normal range of motion. Neck supple. No JVD present.   Cardiovascular:   Tachycardic irregularly irregular absent radial and dorsalis pedis pulses palpable carotid and femoral pulses   Pulmonary/Chest: She has rhonchi.   Agonal shallow respirations   Abdominal: Soft. Bowel sounds are normal. There is no tenderness.   Musculoskeletal: She exhibits edema.   Neurological: She is unresponsive.   Unresponsive   Skin: Capillary refill takes more than 3 seconds. There is pallor.   cool to the touch         ED Course   Intubation  Date/Time: 2/6/2020 11:27 AM  Performed by: Pantera Ahuja MD  Authorized by: Pantera Ahuja MD   Indications: respiratory failure  Intubation method: direct  Patient status: sedated  Preoxygenation: BVM and nasal cannula  Sedatives: etomidate  Paralytic: none  Laryngoscope size: Mac 4  Tube size: 7.0 mm  Tube type: cuffed  Number of attempts: 2  Ventilation between attempts: BVM  Cricoid pressure: yes  Cords visualized: yes  Post-procedure assessment: chest rise and ETCO2 monitor  Breath sounds: equal and absent over the epigastrium  Cuff inflated: yes  ETT to lip: 23 cm  Tube secured with: ETT wright  Chest x-ray interpreted by me.  Chest x-ray findings: endotracheal tube in appropriate position  Patient tolerance: Patient tolerated the procedure well with no immediate complications  Technical procedures used: The NG tube placed  prior to 2nd attempt to remove welling gastric contents    Central Line  Date/Time: 2/6/2020 11:29 AM  Location procedure was performed: [facility] EMERGENCY DEPARTMENT  Performed by: Pantera Ahuja MD  Indications: vascular access and med administration  Preparation: skin prepped with ChloraPrep  Location details: right internal jugular  Catheter type: triple lumen  Ultrasound guidance: yes  Vessel Caliber: medium, patent, compressibility normal  Needle advanced into vessel with real time Ultrasound guidance.  Guidewire confirmed in vessel.  Sterile sheath used.  Number of attempts: 3  Assessment: placement verified by x-ray  Complications: none  Post-procedure: sterile dressing applied,  blood return through all ports,  line sutured and chlorhexidine patch        Labs Reviewed   CBC W/ AUTO DIFFERENTIAL - Abnormal; Notable for the following components:       Result Value    WBC 15.80 (*)     RBC 1.65 (*)     Hemoglobin 5.4 (*)     Hematocrit 18.2 (*)     Mean Corpuscular Volume 110 (*)     Mean Corpuscular Hemoglobin 32.7 (*)     Mean Corpuscular Hemoglobin Conc 29.7 (*)     Platelets 124 (*)     MPV 13.6 (*)     Immature Granulocytes 5.4 (*)     Gran # (ANC) 10.8 (*)     Immature Grans (Abs) 0.86 (*)     Mono # 1.4 (*)     nRBC 4 (*)     Lymph% 17.1 (*)     All other components within normal limits    Narrative:      H&H critical result(s) repeated. Called and verbal readback obtained   from Dr. Ahuja - ER.  by CW1 02/06/2020 10:36   COMPREHENSIVE METABOLIC PANEL - Abnormal; Notable for the following components:    Potassium 3.1 (*)     CO2 7 (*)     Glucose 189 (*)     BUN, Bld 70 (*)     Creatinine 1.9 (*)     Calcium 8.4 (*)     Total Protein 4.1 (*)     Albumin 2.4 (*)     Alkaline Phosphatase 30 (*)     AST 46 (*)     Anion Gap 29 (*)     eGFR if  25.5 (*)     eGFR if non  22.1 (*)     All other components within normal limits    Narrative:      Carbon dioxide critical  result(s) repeated. Called and verbal   readback obtained from Dr. Leigha ANGUIANO.  by CW1 02/06/2020 10:46   PHOSPHORUS - Abnormal; Notable for the following components:    Phosphorus 7.9 (*)     All other components within normal limits   ISTAT PROCEDURE - Abnormal; Notable for the following components:    POC PH 6.951 (*)     POC PO2 41 (*)     POC HCO3 9.8 (*)     POC SATURATED O2 48 (*)     POC Glucose 138 (*)     POC Potassium 3.4 (*)     POC TCO2 11 (*)     POC Hematocrit 15 (*)     All other components within normal limits   CULTURE, BLOOD   CULTURE, BLOOD   CULTURE, RESPIRATORY   MAGNESIUM   URINALYSIS, REFLEX TO URINE CULTURE   LACTIC ACID, PLASMA   TYPE & SCREEN   POCT LACTATE   PREPARE RBC SOFT        ECG Results          EKG 12-lead (In process)  Result time 02/06/20 09:56:27    In process by Interface, Lab In [facility] (02/06/20 09:56:27)                 Narrative:    Test Reason : R00.0,    Vent. Rate : 124 BPM     Atrial Rate : 138 BPM     P-R Int : 000 ms          QRS Dur : 070 ms      QT Int : 332 ms       P-R-T Axes : 000 040 139 degrees     QTc Int : 476 ms    Atrial fibrillation with rapid ventricular response  ST and T wave abnormality, consider inferolateral ischemia  Abnormal ECG  When compared with ECG of 12-AUG-2017 09:32,  Criteria for Inferior infarct are no longer Present  ST now depressed in Anterior leads    Referred By: [email or web address]   SELF           Confirmed By:                             Imaging Results          X-Ray Chest 1 View (In process)                  Medical Decision Making:   ED Management:  After intubation central line placed in the right IJ consulted anesthesiology who placed a arterial line in the left wrist I have discussed case with Dr. Gurrola who has come to the emergency department to evaluate patient patient is receiving a emergent transfusion of packed red blood cells I have discussed case with Dr. Hoskins who will evaluate patient emergency department for admission               Attending Attestation:         Attending Critical Care:   Critical Care Times:   Direct Patient Care (initial evaluation, reassessments, and time considering the case)................................................................60 minutes.   Ordering, Reviewing, and Interpreting Diagnostic Studies...............................................................................................................15 minutes.   Documentation..................................................................................................................................................................................5 minutes.   Consultation with other Physicians. .................................................................................................................................................10 minutes.   ==============================================================  · Total Critical Care Time - exclusive of procedural time: 90 minutes.  ==============================================================  Critical care was necessary to treat or prevent imminent or life-threatening deterioration of the following conditions: GI bleeding and respiratory failure.   The following critical care procedures were done by me (see procedure notes): airway management, central line placement *, endotracheal tube placement *, NG or OG tube placement and pulse oximetry.   Critical care was time spent personally by me on the following activities: discussion with consultants, interpretation of cardiac measurements, evaluation of patient's response to treatment, ordering and performing treatments and interventions, ordering lab, x-rays, and/or EKG and examination of patient.   Critical Care Condition: critical                             Clinical Impression:       ICD-10-CM ICD-9-CM   1. Gastrointestinal hemorrhage, unspecified gastrointestinal hemorrhage type K92.2 578.9   2. Tachycardia R00.0 785.0   3. Acute  respiratory failure with hypoxia J96.01 518.81   4. Anemia due to acute blood loss D62 285.1                             Pantera Ahuja MD  02/06/20 1143

## 2020-02-06 NOTE — HPI
Ms. Sravani Beck is a 89-year-old lady with past medical history of atrial fibrillation and coronary artery disease currently taking Xarelto was brought into the emergency department by her family this morning who found her to be unresponsive when they tried to arouse her.  Bowel reports she was in her usual state of health over the last couple days without any specific complaints.  Upon arrival in the emergency department she was found to be obtained did, and was promptly intubated.  After administering a paralytic she vomited a large volume of coffee ground emesis.  She is noted to be in profound shock and has been receiving blood products since.  I am unable to obtain any additional history of present illness or characterization of her symptoms due to her being intubated at this time.

## 2020-02-06 NOTE — PLAN OF CARE
Pt seen, examined and record reviewed.  Here with massive UGIB and hemorraghic shock.  On Xarelto at home. Currently intubated and in the process of being resuscitated.  Discussed her current critical condition with her son.  He is in agreement that if cardiac arrest occurs, initiating ACLS would not be appropriate.    Plan:  -  Admit to ICU.  -  Continue to resuscitate with colloid.  -  GI aware.  -  Give PCC.  -  Continue LPV.  -  No ACLS/CPR if cardiac arrest occurs.    Full consult to follow.    Negro Gurrola MD  [facility]  02/06/2020  11:25 AM

## 2020-02-06 NOTE — ASSESSMENT & PLAN NOTE
· Intermittent fentanyl boluses to maintain light sedation.  · Daily spontaneous awakening trials.

## 2020-02-06 NOTE — CARE UPDATE
MD Gurrola at bedside making vent changes.       02/06/20 1105   Patient Assessment/Suction   Level of Consciousness (AVPU) unresponsive   PRE-TX-O2   O2 Device (Oxygen Therapy) ventilator   Oxygen Concentration (%) 50   SpO2 (!) 75 %   Pulse (!) 139   Resp (!) 34   Vent Select   Conventional Vent Y   Charged w/in last 24h YES   Preset Conventional Ventilator Settings   Vent ID [id number]   Vent Type    Ventilation Type VC   Vent Mode A/C   Set Rate 30 BPM   Vt Set 400 mL   PEEP/CPAP 5 cmH20   Pressure Support 0 cmH20   Waveform RAMP   Peak Flow 55 L/min   Plateau Set/Insp. Hold (sec) 0   Trigger Sensitivity Flow/I-Trigger 3 L/min   Patient Ventilator Parameters   Resp Rate Total 35 br/min   Peak Airway Pressure 17 cmH2O   Mean Airway Pressure 9.8 cmH20   Plateau Pressure 0 cmH20   Exhaled Vt 406 mL   Total Ve 14.2 mL   I:E Ratio Measured 1:1.10   Conventional Ventilator Alarms   Resp Rate High Alarm 40 br/min   Press High Alarm 50 cmH2O   Apnea Rate 10   Apnea Volume (mL) 0 mL   Apnea Oxygen Concentration  100   Apnea Flow Rate (L/min) 52   T Apnea 20 sec(s)   Ready to Wean/Extubation Screen   FIO2<=50 (chart decimal) 0.5   MV<16L (chart vol.) 14.2   PEEP <=8 (chart #) 5   Ready to Wean Parameters   F/VT Ratio<105 (RSBI) (!) 83.74

## 2020-02-06 NOTE — [employer] PATIENT INSTRUCTIONS
Discharge Summary/Instructions after an Endoscopic Procedure  Patient Name: Sravani Beck  Patient MRN: [id number]  Patient YOB: 1924  Thursday, February 06, 2020  Austin Barone III, MD  RESTRICTIONS:  During your procedure today, you received medications for sedation.  These   medications may affect your judgment, balance and coordination.  Therefore,   for 24 hours, you have the following restrictions:   - DO NOT drive a car, operate machinery, make legal/financial decisions,   sign important papers or drink alcohol.    ACTIVITY:  Today: no heavy lifting, straining or running due to procedural   sedation/anesthesia.  The following day: return to full activity including work.  DIET:  Eat and drink normally unless instructed otherwise.     TREATMENT FOR COMMON SIDE EFFECTS:  - Mild abdominal pain, nausea, belching, bloating or excessive gas:  rest,   eat lightly and use a heating pad.  - Sore Throat: treat with throat lozenges and/or gargle with warm salt   water.  - Because air was used during the procedure, expelling large amounts of air   from your rectum or belching is normal.  - If a bowel prep was taken, you may not have a bowel movement for 1-3 days.    This is normal.  SYMPTOMS TO WATCH FOR AND REPORT TO YOUR PHYSICIAN:  1. Abdominal pain or bloating, other than gas cramps.  2. Chest pain.  3. Back pain.  4. Signs of infection such as: chills or fever occurring within 24 hours   after the procedure.  5. Rectal bleeding, which would show as bright red, maroon, or black stools.   (A tablespoon of blood from the rectum is not serious, especially if   hemorrhoids are present.)  6. Vomiting.  7. Weakness or dizziness.  GO DIRECTLY TO THE NEAREST EMERGENCY ROOM IF YOU HAVE ANY OF THE FOLLOWING:      Difficulty breathing              Chills and/or fever over 101 F   Persistent vomiting and/or vomiting blood   Severe abdominal pain   Severe chest pain   Black, tarry stools   Bleeding- more than one  tablespoon   Any other symptom or condition that you feel may need urgent attention  Your doctor recommends these additional instructions:  If any biopsies were taken, your doctors clinic will contact you in 1 to 2   weeks with any results.  - [facility].   - Case discussed w/ family present. Various options discussed and would like   conservative for now given clinical condition and age. If signs of ongoing   bleeding or other concerns, they ay be agreeable to push / colon.  For questions, problems or results please call your physician - Austin Barone III, MD at Work:  [phone].  [facility], EMERGENCY ROOM PHONE NUMBER: [phone]  IF A COMPLICATION OR EMERGENCY SITUATION ARISES AND YOU ARE UNABLE TO REACH   YOUR PHYSICIAN - GO DIRECTLY TO THE EMERGENCY ROOM.  Austin Braone III, MD  2/6/2020 12:55:45 PM  This report has been verified and signed electronically.  [employer]

## 2020-02-06 NOTE — ANESTHESIA PROCEDURE NOTES
Arterial    Diagnosis: Sepsis    Patient location during procedure: ED  Procedure start time: 2/6/2020 10:16 AM  Timeout: 2/6/2020 10:15 AM  Procedure end time: 2/6/2020 10:20 AM    Staffing  Authorizing Provider: Artur Matta Jr., MD  Performing Provider: Artur Matta Jr., MD    Anesthesiologist was present at the time of the procedure.    Preanesthetic Checklist  Completed: patient identified, site marked, surgical consent, pre-op evaluation, timeout performed, IV checked, risks and benefits discussed, monitors and equipment checked and anesthesia consent givenArterial  Skin Prep: chlorhexidine gluconate  Local Infiltration: lidocaine  Orientation: left  Location: radial  Catheter Size: 20 G  Catheter placement by Ultrasound guidance. Heme positive aspiration all ports.  Vessel Caliber: small, patent  Needle advanced into vessel with real time Ultrasound guidance.  Guidewire confirmed in vessel.  Sterile sheath used.Insertion Attempts: 1  Assessment  Dressing: secured with tape and tegaderm and sutured in place and taped  Patient: Tolerated well

## 2020-02-06 NOTE — ASSESSMENT & PLAN NOTE
· Elevated troponin noted and echocardiography reviewed at the bedside.  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  · Hold DOAC.  · Bolus a L of balance crystalloid.  · Persistent shock likely a consequence of sepsis.  Continue to titrate norepinephrine and phenylephrine infusions to maintain a map greater than 65.  · Persistent /refractory acidemia suggests a poor prognosis.

## 2020-02-06 NOTE — CONSULTS
[facility]  Pulmonology   Consult Note    Inpatient consult to Pulmonology  Consult performed by: Negro Gurrola MD  Consult ordered by: Chong Hoskins MD  Reason for consult: Hemorrhagic shock  Assessment/Recommendations: Upper GI bleed with hemorrhagic shock:  -  GI following.  EGD unrevealing.  -  colloid resuscitation.  -  PCC  -  supportive care.         Subjective     Chief Complaint   Patient presents with    Weakness     pt found unresponsive by family. awake, alert.      History of Present Illness:  Ms. Sravani Beck is a 89-year-old lady with past medical history of atrial fibrillation and coronary artery disease currently taking Xarelto was brought into the emergency department by her family this morning who found her to be unresponsive when they tried to arouse her.  Bowel reports she was in her usual state of health over the last couple days without any specific complaints.  Upon arrival in the emergency department she was found to be obtained did, and was promptly intubated.  After administering a paralytic she vomited a large volume of coffee ground emesis.  She is noted to be in profound shock and has been receiving blood products since.  I am unable to obtain any additional history of present illness or characterization of her symptoms due to her being intubated at this time.     Past Medical History:   Diagnosis Date    AF (paroxysmal atrial fibrillation)     CAD (coronary artery disease) 1970    CABG x 4    HTN (hypertension), benign     Hyperlipidemia LDL goal <70      Past Surgical History:   Procedure Laterality Date    CARDIOVERSION  2/05 & 10/05    CHOLECYSTECTOMY      CORONARY ARTERY BYPASS GRAFT  1994    x 3    HYSTERECTOMY       Family History   Problem Relation Age of Onset    Cerebral aneurysm Mother     Cancer Neg Hx       Social History     Tobacco Use    Smoking status: Never Smoker    Smokeless tobacco: Never Used   Substance and Sexual Activity     Alcohol use: Yes     Alcohol/week: 7.0 standard drinks     Types: 7 Glasses of wine per week    Drug use: No    Sexual activity: Not on file      Allergies:  No known drug allergies     Facility-Administered Medications as of 2/6/2020   Medication Route Frequency    0.9%  NaCl infusion (for blood administration) Intravenous Q24H PRN    0.9%  NaCl infusion (for blood administration) Intravenous Q24H PRN    calcium chloride 1 g in dextrose 5 % 100 mL IVPB Intravenous PRN    calcium chloride 1 g in dextrose 5 % 100 mL IVPB Intravenous PRN    calcium chloride 1 g in dextrose 5 % 100 mL IVPB Intravenous PRN    chlorhexidine 0.12 % solution 15 mL Mouth/Throat BID    dextrose 50% injection 12.5 g Intravenous PRN    dextrose 50% injection 25 g Intravenous PRN    fentaNYL (SUBLIMAZE) 2,500 mcg in dextrose 5 % 250 mL infusion Intravenous Continuous    fentaNYL injection 50 mcg Intravenous Q15 Min PRN    Followed by    [START ON 2/8/2020] fentaNYL injection 50 mcg Intravenous Q1H PRN    glucagon (human recombinant) injection 1 mg Intramuscular PRN    glucose chewable tablet 16 g Oral PRN    glucose chewable tablet 24 g Oral PRN    [COMPLETED] human prothrombin complex (PCC) (KCENTRA) 500 unit (400-620 unit) injection 1,250 Units Intravenous Once    lactated ringers bolus 1,000 mL Intravenous Once    magnesium oxide tablet 800 mg Oral PRN    magnesium sulfate 2g in water 50mL IVPB (premix) Intravenous PRN    magnesium sulfate 2g in water 50mL IVPB (premix) Intravenous PRN    magnesium sulfate 2g in water 50mL IVPB (premix) Intravenous PRN    magnesium sulfate in dextrose IVPB (premix) 1 g Intravenous PRN    mupirocin 2 % ointment Nasal BID    norepinephrine 4 mg in dextrose 5 % 250 mL infusion Intravenous Continuous    pantoprazole (PROTONIX) 40 mg in dextrose 5 % 100 mL infusion Intravenous Continuous    pantoprazole injection 40 mg Intravenous ED 1 Time    phenylephrine (ZENY-SYNEPHRINE) 20 mg in  "sodium chloride 0.9% 250 mL infusion Intravenous Continuous    piperacillin-tazobactam 4.5 g in dextrose 5 % 100 mL IVPB (ready to mix system) Intravenous Q12H    potassium chloride 10 mEq in 100 mL IVPB Intravenous PRN    potassium chloride 10 mEq in 100 mL IVPB Intravenous PRN    potassium chloride 10 mEq in 100 mL IVPB Intravenous PRN    potassium chloride 10 mEq in 100 mL IVPB Intravenous PRN    potassium chloride SA CR tablet 20 mEq Oral PRN    potassium chloride SA CR tablet 20 mEq Oral PRN    potassium chloride SA CR tablet 40 mEq Oral PRN    potassium chloride SA CR tablet 40 mEq Oral PRN    [COMPLETED] sodium chloride 0.9% bolus 1,000 mL Intravenous Once    sodium chloride 0.9% flush 10 mL Intravenous PRN    sodium phosphate 15 mmol in dextrose 5 % 250 mL IVPB Intravenous PRN    sodium phosphate 15 mmol in dextrose 5 % 250 mL IVPB Intravenous PRN    sodium phosphate 20.01 mmol in dextrose 5 % 250 mL IVPB Intravenous PRN    sodium phosphate 20.01 mmol in dextrose 5 % 250 mL IVPB Intravenous PRN    sodium phosphate 30 mmol in dextrose 5 % 250 mL IVPB Intravenous PRN     Outpatient Medications as of 2/6/2020   Medication    atorvastatin (LIPITOR) 40 MG tablet    lisinopril 10 MG tablet      Review of Systems   Unable to perform ROS: Intubated      I have personally reviewed the following: active problem list, medication list, allergies, family history, social history, health maintenance, notes from last encounter, lab results, endoscopy procedure notes, imaging and nursing/provider documentation .    Objective     VS: Temp:  [98 °F (36.7 °C)-98.2 °F (36.8 °C)]   Pulse:  []   Resp:  [22-34]   BP: ()/()   SpO2:  [75 %-98 %]   Arterial Line BP: (35-90)/(29-55)    Estimated body mass index is 17.75 kg/m² as calculated from the following:    Height as of this encounter: 5' 6" (1.676 m).    Weight as of this encounter: 49.9 kg (110 lb).   Ideal body weight: 59.3 kg (130 lb 11.7 " oz)   I/O: No intake or output data in the 24 hours ending 02/06/20 1649     Vent: SpO2 (!) 94%  (this is not correlate with her arterial blood gas)  Vent Mode: A/C  Oxygen Concentration (%):  [] 45  Resp Rate Total:  [26 br/min-39 br/min] 30 br/min  Vt Set:  [400 mL-450 mL] 400 mL  PEEP/CPAP:  [5 cmH20] 5 cmH20  Pressure Support:  [0 cmH20] 0 cmH20  Mean Airway Pressure:  [7.5 fzQ09-39 cmH20] 20 cmH20     PE Physical Exam   Constitutional: She appears well-developed. She appears cachectic.  Non-toxic appearance. No distress. She is sedated, intubated and restrained. She is not obese.   HENT:   Head: Normocephalic and atraumatic.   Right Ear: External ear normal.   Left Ear: External ear normal.   Nose: Nose normal.   Mouth/Throat: Uvula is midline and mucous membranes are normal. Abnormal dentition. Mallampati Score: unable to assess.   Neck: Trachea normal and normal range of motion. Neck supple. No JVD present. No tracheal deviation present. No thyromegaly present.   Cardiovascular: Normal rate, regular rhythm, normal heart sounds and intact distal pulses. Exam reveals no gallop and no friction rub.   No murmur heard.  Pulmonary/Chest: Normal expansion, symmetric chest wall expansion, effort normal and breath sounds normal. She is intubated. She has no wheezes. She has no rhonchi. She has no rales.   Well healed sternotomy   Abdominal: Soft. Bowel sounds are normal. She exhibits no distension and no mass. There is no tenderness. No hernia.   Musculoskeletal: Normal range of motion. She exhibits no edema or deformity.   Lymphadenopathy: No supraclavicular adenopathy is present.     She has no cervical adenopathy.     She has no axillary adenopathy.   Neurological: She is unresponsive. She displays no atrophy. She exhibits normal muscle tone. She displays no seizure activity. GCS eye subscore is 1. GCS verbal subscore is 1. GCS motor subscore is 6.   Skin: Skin is warm, dry and intact. No rash noted. No  cyanosis. Nails show no clubbing.   Psychiatric:   sedate   Nursing note and vitals reviewed.       Recent Diagnostic Studies:  Labs I have personally reviewed and interpreted all recent labs / diagnostic studies, and noted the following relevant results:  All pertinent labs within the past 24 hours have been reviewed.  Recent Labs   Lab 02/06/20  0945  02/06/20  1439 02/06/20  1538   WBC 15.80*  --   --  9.68  9.68   RBC 1.65*  --   --  4.23  4.23   HGB 5.4*  --   --  13.1  13.1   HCT 18.2*   < > 37 40.2  40.2   *  --   --  80*  80*   *  --   --  95  95   MCH 32.7*  --   --  31.0  31.0   MCHC 29.7*  --   --  32.6  32.6     --   --  135*   K 3.1*  --   --  2.8*     --   --  104   CO2 7*  --   --  16*   BUN 70*  --   --  69*   CREATININE 1.9*  --   --  1.9*   MG 2.3  --   --  1.7   ALT 22  --   --   --    AST 46*  --   --   --    ALKPHOS 30*  --   --   --    BILITOT 0.9  --   --   --    PROT 4.1*  --   --   --    ALBUMIN 2.4*  --   --   --    PH  --    < > 7.198*  --    PCO2  --    < > 27.3*  --    PO2  --    < > 91  --    HCO3  --    < > 10.6*  --    POCSATURATED  --    < > 95  --    BE  --    < > -17  --    INR 2.7  --   --  1.8   APTT  --   --   --  30.7    < > = values in this interval not displayed.     Serum lactate:  6.6  Urinalysis:  Greater than 100,000 RBCs per HPF, WBCs greater than 100,000 for HPF, 1+ leukocyte esterase, 1+ protein, cloudy appearance   Imaging I have personally reviewed and interpreted all available images and reviewed the associated Radiology reports.  My personal impression of the relevant studies is as follows:  I have reviewed and interpreted all pertinent imaging results/findings within the past 24 hours.  CXR:  Radiology report:  1. Tiny right apical pneumothorax.  2. Support tubes and lines as above.  3. Diffuse right lung perihilar patchy alveolar and interstitial opacities either reflecting unilateral pulmonary edema or infectious or inflammatory  pneumonia.    My impression:  Opacification throughout the right lung likely representing aspiration.  Questionable tiny right apical pneumothorax     Micro I have personally reviewed and interpreted the available culture data.  Relevant results are as follows.  Blood Culture No results found for: LABBLOO, Sputum Culture No results found for: GSRESP, RESPIRATORYC and Urine Culture         Assessment       Active Hospital Problems    Diagnosis    Hemorrhagic shock    Encephalopathy, metabolic    Acute blood loss anemia    On anticoagulant therapy    Thrombocytopenia due to blood loss    Elevated lactic acid level    Hypokalemia    History of embolic stroke    Cystitis    Arteriosclerotic cerebrovascular disease    Paroxysmal atrial fibrillation    CAD (coronary artery disease)      My Impression:  Hemorrhagic shock secondary to upper GI bleed the setting of chronic anticoagulation.    Plan     Neuro  · Intermittent fentanyl boluses to maintain light sedation.  · Daily spontaneous awakening trials.    Pulmonary  · Continue lung protective ventilation for now.  · Continue pip/tazo for witnessed aspiration and cystitis.  · Daily spontaneous breathing trials.    Cardiac/Vascular  · Obtain EKG and troponin.  · No compelling evidence of ACS however.  · Hold DOAC.  · Seems volume replete.  · Persistent shock may be a component of sepsis.  Continue to titrate norepinephrine infusion to maintain a map greater than 65.    Renal/  · Supportive care.  · Replete electrolytes as needed.  · Slight SHERITA should improve with volume resuscitation    ID  · Continue pip/tazo for cystitis.  · Follow up culture data    Hematology  · Continue to trend H&H and transfuse to maintain H/H greater than 7/21.  · No compelling indication for additional blood products at this time.    GI  · GI following.  · Continue PPI infusion for now.     Thank you for your consult. I will follow-up with patient. Please contact us if you have any  additional questions.    Negro Gurrola MD  Pulmonary / Critical Care Medicine  [facility]      Critical Care Time: Approximately 61 minutes    The patient is critically ill due to the following conditions that actively pose threat to life and bodily function:  Acute GI Bleed requiring GI consultation and with plans for emergent endoscopy, requiring transfusion of blood products and with Hgb < 7, Shock     The patient is at high risk of death due to central nervous system failure, circulatory failure, respiratory failure and requiring ongoing treatment including invasive mechanical ventilation, titration of vasoactive medications, frequent neurologic assessment to prevent further life-threatening deterioration.  Due to a high probability of clinically significant, life threatening deterioration, the patient required my highest level of preparedness to intervene emergently and I personally spent this critical care time directly and personally managing the patient.     Critical care was time spent personally by me on the following activities:    Obtaining a history   Examination of patient.   Reviewing pulse oximetry.   Providing medical care at the patients bedside.   Developing a treatment plan with patient or surrogate and bedside caregivers   Ordering and reviewing laboratory studies, radiographic studies, pulse oximetry.   Ordering and performing treatments and interventions.   Evaluation of patient's response to treatment.   Discussions with consultants while on the unit and immediately available to the patient.   Re-evaluation of the patient's condition.   Documentation in the medical record.    This critical care time did not overlap with that of any other provider or involve time for any procedures.     Negro Gurrola MD  Pulmonary / Critical Care Medicine  [facility]  02/06/2020  4:50 PM

## 2020-02-06 NOTE — CONSULTS
GASTROENTEROLOGY INPATIENT CONSULT NOTE  Patient Name: Sravani Beck  Patient MRN: [id number]  Patient : 7/15/1924    Admit Date: 2020  Service date: 2020    Reason for Consult: weakness / anemia from suspected GIB    PCP: Gracia Martinez MD    Chief Complaint   Patient presents with    Weakness     pt found unresponsive by family. awake, alert.       HPI: Patient is a 89 y.o. female with PMHx Afib (Xarelto), CAD / CABG, HTN, HLD, SHAI presents for evaluation of weakness. Acute onset, intermittent, progressive over past 2-3 days. Son states had a fall Monday but she did well and went to [facility] on Tuesday. No h/o liver disease, PUD, GIB. Patient severely anemia in ER w/ significant hypotension requiring resusucitation w/ pRBCs, pressors and anticoagulation reversal agents. No recent endoscopies.       Past Medical History:  Past Medical History:   Diagnosis Date    AF (paroxysmal atrial fibrillation)     CAD (coronary artery disease)     CABG x 4    HTN (hypertension), benign     Hyperlipidemia LDL goal <70         Past Surgical History:  Past Surgical History:   Procedure Laterality Date    CARDIOVERSION   & 10/05    CHOLECYSTECTOMY      CORONARY ARTERY BYPASS GRAFT  1994    x 3    HYSTERECTOMY          Home Medications:    (Not in a hospital admission)    Inpatient Medications:   chlorhexidine  15 mL Mouth/Throat BID    pantoprazole  40 mg Intravenous ED 1 Time    piperacillin-tazobactam (ZOSYN) IVPB  4.5 g Intravenous Q8H     sodium chloride, sodium chloride, calcium chloride IVPB, calcium chloride IVPB, calcium chloride IVPB, dextrose 50%, dextrose 50%, fentaNYL **FOLLOWED BY** [START ON 2020] fentaNYL, glucagon (human recombinant), glucose, glucose, magnesium oxide, magnesium sulfate IVPB, magnesium sulfate IVPB, magnesium sulfate IVPB, magnesium sulfate IVPB, potassium chloride in water, potassium chloride in water, potassium chloride in water, potassium chloride in water,  "potassium chloride, potassium chloride, potassium chloride, potassium chloride, sodium chloride 0.9%, sodium phosphate IVPB, sodium phosphate IVPB, sodium phosphate IVPB, sodium phosphate IVPB, sodium phosphate IVPB    Review of patient's allergies indicates:   Allergen Reactions    No known drug allergies        Social History:   Social History     Occupational History    Not on file   Tobacco Use    Smoking status: Never Smoker    Smokeless tobacco: Never Used   Substance and Sexual Activity    Alcohol use: Yes     Alcohol/week: 7.0 standard drinks     Types: 7 Glasses of wine per week    Drug use: No    Sexual activity: Not on file       Family History:   Family History   Problem Relation Age of Onset    Cerebral aneurysm Mother     Cancer Neg Hx        Review of Systems:  A 10 point review of systems was performed and was normal, except as mentioned in the HPI, including constitutional, HEENT, heme, lymph, cardiovascular, respiratory, gastrointestinal, genitourinary, neurologic, endocrine, psychiatric and musculoskeletal.      OBJECTIVE:    Physical Exam:  24 Hour Vital Sign Ranges: Pulse:  [] 142  Resp:  [22-34] 28  SpO2:  [75 %-85 %] 77 %  BP: ()/() 147/118  Most recent vitals: BP (!) 147/118   Pulse (!) 142   Resp (!) 28   Ht 5' 6" (1.676 m)   Wt 49.9 kg (110 lb)   SpO2 (!) 77%   BMI 17.75 kg/m²    GEN: elderly WF on vent  HEENT: OGT in place , sclera anicteric, oral mucosa pink and moist without lesion; coffee ground on lips  NECK: trachea midline; ETT in place  CV: tachy  RESP: coarse on vent  ABD: soft, non-tender, non-distended, hypoactive bowel sounds  EXT: no swelling or edema, faint pulses distally  SKIN: no rashes or jaundice  PSYCH: n/a    Labs:   Recent Labs     02/06/20  0945   WBC 15.80*   *   *     Recent Labs     02/06/20  0945      K 3.1*      CO2 7*   BUN 70*   *     No results for input(s): ALB in the last 72 hours.    Invalid " input(s): ALKP, SGOT, SGPT, TBIL, DBIL, TPRO  Recent Labs     02/06/20  0945   INR 2.7         Radiology Review:  X-Ray Chest 1 View   Final Result      1. Tiny right apical pneumothorax.   2. Support tubes and lines as above.   3. Diffuse right lung perihilar patchy alveolar and interstitial opacities either reflecting unilateral pulmonary edema or infectious or inflammatory pneumonia.   Significant Alert: Tiny right apical pneumothorax      The significant finding above was relayed by myself  by telephone to Dr. Ahuja on 2/6/2020 at 11:53.      SIGNIFICANT FINDINGS:      RECOMMENDATIONS:         Electronically signed by: Fredi Bass MD   Date:    02/06/2020   Time:    11:56            IMPRESSION / RECOMMENDATIONS:  89 y.o. female with PMHx Afib (Xarelto), CAD / CABG, HTN, HLD, SHAI presents for evaluation of weakness w/ severe anemia in setting of shock / anticoagulation. Risks, benefits, alternative discussed in detail with patient / family regarding anticipated procedure and possible complications.     -Emergent EGD to evaluate  -PPI  -HD support    Thank you for this consult.    Austin Barone III  2/6/2020  12:34 PM

## 2020-02-06 NOTE — PROGRESS NOTES
Renal Dosing of Medication    An order has been entered by a pharmacist to adjust the dose and/or frequency of the medication listed below based on the patient's renal function.    Objective:  89 y.o., female, Actual Body Weight = 49.9 kg (110 lb)  Current Regimen:  Zosyn 4.5 grams IV q8h    Assessment:  Estimated Creatinine Clearance: 14 mL/min (A) (based on SCr of 1.9 mg/dL (H)).  Renal function is poor.    Plan:  Orders have been entered to adjust the dose and/or frequency based on the patient's weight and renal function:  Zosyn 4.5 grams IV every 12 hours.    Thank you for allowing us to participate in this patient's care.     Hortencia Herrera 2/6/2020 3:45 PM  Department of Pharmacy  Ext [phone]

## 2020-02-06 NOTE — ASSESSMENT & PLAN NOTE
· Continue lung protective ventilation for now.  · Continue pip/tazo and add vancomycin  · For pneumonia.  Small non expanding pneumothorax noted.  Given her overall dismal prognosis I see no utility in placing a chest tube for this clinically inconsequential small pneumothorax.

## 2020-02-06 NOTE — ASSESSMENT & PLAN NOTE
· Continue to trend H&H and transfuse to maintain H/H greater than 7/21.  · No compelling indication for additional blood products at this time.

## 2020-02-07 PROBLEM — I51.81 STRESS-INDUCED CARDIOMYOPATHY: Status: ACTIVE | Noted: 2020-01-01

## 2020-02-07 PROBLEM — J93.9 PNEUMOTHORAX: Status: ACTIVE | Noted: 2020-01-01

## 2020-02-07 PROBLEM — K72.00 SHOCK LIVER: Status: ACTIVE | Noted: 2020-01-01

## 2020-02-07 PROBLEM — J69.0 ASPIRATION PNEUMONIA: Status: ACTIVE | Noted: 2020-01-01

## 2020-02-07 PROBLEM — A41.9 SEPTIC SHOCK: Status: ACTIVE | Noted: 2020-01-01

## 2020-02-07 PROBLEM — E22.2 SIADH (SYNDROME OF INAPPROPRIATE ADH PRODUCTION): Status: ACTIVE | Noted: 2020-01-01

## 2020-02-07 PROBLEM — N17.0 ATN (ACUTE TUBULAR NECROSIS): Status: ACTIVE | Noted: 2020-01-01

## 2020-02-07 PROBLEM — N17.9 AKI (ACUTE KIDNEY INJURY): Status: ACTIVE | Noted: 2020-01-01

## 2020-02-07 PROBLEM — R65.21 SEPTIC SHOCK: Status: ACTIVE | Noted: 2020-01-01

## 2020-02-07 PROBLEM — I21.4 NSTEMI (NON-ST ELEVATED MYOCARDIAL INFARCTION): Status: ACTIVE | Noted: 2020-01-01

## 2020-02-07 PROBLEM — D65 DIC (DISSEMINATED INTRAVASCULAR COAGULATION): Status: ACTIVE | Noted: 2020-01-01

## 2020-02-07 PROBLEM — E87.1 HYPONATREMIA: Status: ACTIVE | Noted: 2020-01-01

## 2020-02-07 NOTE — PROGRESS NOTES
Pharmacokinetic Initial Assessment: IV Vancomycin    Assessment/Plan:    Initiate intravenous vancomycin with loading dose of 1000 mg once with subsequent doses when random concentrations are less than 20 mcg/mL  Desired empiric serum trough concentration is 10 to 15 mcg/mL  Draw vancomycin random level on 2/8/20 at 09:00.  Pharmacy will continue to follow and monitor vancomycin.      Please contact pharmacy at extension [phone] with any questions regarding this assessment.     Thank you for the consult,   Gracia Guerra       Patient brief summary:  Sravani Beck is a 89 y.o. female initiated on antimicrobial therapy with IV Vancomycin for treatment of suspected lower respiratory infection    Drug Allergies:   Review of patient's allergies indicates:   Allergen Reactions    No known drug allergies        Actual Body Weight:   55 kg    Renal Function:   Estimated Creatinine Clearance: 15.4 mL/min (A) (based on SCr of 1.9 mg/dL (H)).,       CBC (last 72 hours):  Recent Labs   Lab Result Units 02/06/20  0945 02/06/20  1538 02/06/20 2020 02/06/20 2243 02/07/20  0313 02/07/20  0721   WBC K/uL 15.80* 9.68  9.68 9.87 7.12  7.12 8.32  8.32 9.47   Hemoglobin g/dL 5.4* 13.1  13.1 14.2 11.9*  11.9* 13.3  13.3 13.3   Hematocrit % 18.2* 40.2  40.2 40.4 35.4*  35.4* 37.8  37.8 38.6   Platelets K/uL 124* 80*  80* 70* 63*  63* 49*  49* 46*   Gran% % 68.5  --   --  48.7 21.0*  --    Lymph% % 17.1*  --   --  45.4 49.0*  --    Mono% % 8.7  --   --  2.7* 4.0  --    Eosinophil% % 0.2  --   --  0.7 1.0  --    Basophil% % 0.1  --   --  0.8 0.0  --    Differential Method  Automated  --   --  Automated Manual  --        Metabolic Panel (last 72 hours):  Recent Labs   Lab Result Units 02/06/20  0945 02/06/20  1427 02/06/20  1538 02/06/20 2243 02/07/20  0313   Sodium mmol/L 143  --  135* 130* 126*   Potassium mmol/L 3.1*  --  2.8* 3.8 3.1*   Chloride mmol/L 107  --  104 105 102   CO2 mmol/L 7*  --  16* 13* 14*   Glucose mg/dL  189*  --  96 157* 110   Glucose, UA   --  Negative  --   --   --    BUN, Bld mg/dL 70*  --  69* 64* 61*   Creatinine mg/dL 1.9*  --  1.9* 1.8* 1.9*   Albumin g/dL 2.4*  --   --  1.9* 2.0*   Total Bilirubin mg/dL 0.9  --   --  1.4* 1.5*   Alkaline Phosphatase U/L 30*  --   --  33* 37*   AST U/L 46*  --   --  152* 180*   ALT U/L 22  --   --  67* 74*   Magnesium mg/dL 2.3  --  1.7 2.4 1.8   Phosphorus mg/dL 7.9*  --  3.2 3.1 2.9       Drug levels (last 3 results):  No results for input(s): VANCOMYCINRA, VANCOMYCINPE, VANCOMYCINTR in the last 72 hours.    Microbiologic Results:  Microbiology Results (last 7 days)       Procedure Component Value Units Date/Time    Culture, Respiratory with Gram Stain [[id number]] Collected:  02/06/20 1026    Order Status:  Completed Specimen:  Endotracheal from Sputum Updated:  02/07/20 0844     Respiratory Culture Reduced Normal Respiratory eze with yeast present    Urine culture [[id number]]  (Abnormal) Collected:  02/06/20 1427    Order Status:  Completed Specimen:  Urine Updated:  02/07/20 0808     Urine Culture, Routine GRAM NEGATIVE STONEY, LACTOSE   >100,000 cfu/ml  Identification and susceptibility pending      Narrative:       Preferred Collection Type->Urine, Clean Catch  Specimen Source->Urine    Blood culture x two cultures. Draw prior to antibiotics [[id number]]     Order Status:  Canceled Specimen:  Blood     Blood culture x two cultures. Draw prior to antibiotics [[id number]]     Order Status:  Canceled Specimen:  Blood

## 2020-02-07 NOTE — PT/OT/SLP PROGRESS
Physical Therapy      Patient Name:  Sravnai Beck   MRN:  [id number]    Patient not seen today secondary to Other (Comment)(Pt on ventilator with poor prognosis. Will DC PT orders. Please reconsult if pt becomes appropriate for PT. ). Thank you.    Eden Osborne, PT

## 2020-02-07 NOTE — PROGRESS NOTES
"[facility] Medicine Progress Note    Subjective:     Overnight events noted. Currently on 3 pressors and remains intubated requiring significant support. UOP ok.   H/h normalized and no further bleeding however remains in shock.      Objective:   Last 24 Hour Vital Signs:  BP  Min: 83/53  Max: 157/83  Temp  Av.6 °F (36.4 °C)  Min: 97 °F (36.1 °C)  Max: 98.2 °F (36.8 °C)  Pulse  Av.9  Min: 102  Max: 197  Resp  Av.6  Min: 23  Max: 45  SpO2  Av.1 %  Min: 74 %  Max: 99 %  Height  Av' 6" (167.6 cm)  Min: 5' 6" (167.6 cm)  Max: 5' 6" (167.6 cm)  Weight  Av kg (121 lb 4.1 oz)  Min: 55 kg (121 lb 4.1 oz)  Max: 55 kg (121 lb 4.1 oz)  I/O last 3 completed shifts:  In: 4823.3 [I.V.:3123.3; IV Piggyback:1700]  Out: 400 [Urine:400]      Physical Examination:  Physical Exam   Constitutional:   Elderly, thin female   HENT:   GT and ETT in place  Eyes: Pupils are equal, round, and reactive to light. No scleral icterus.   Neck: Neck supple. No JVD present.   Cardiovascular:   irr irr, tachy  Thready distal pulses   Pulmonary/Chest:   Intubated, on ventilator  +rhonchi   Abdominal: Soft. There is no rebound and no guarding.   Musculoskeletal: She exhibits no edema.   Neurological:   Intubated  Moving all ext   Skin: Capillary refill takes more than 3 seconds.       Laboratory:  Laboratory Data Reviewed: yes    Most Recent Data:  CBC:   Lab Results   Component Value Date    WBC 8.90 2020    HGB 12.8 2020    HCT 36.5 (L) 2020    PLT 41 (L) 2020    MCV 89 2020    RDW 17.1 (H) 2020     BMP:   Lab Results   Component Value Date     (L) 2020    K 3.1 (L) 2020     2020    CO2 14 (L) 2020    BUN 61 (H) 2020     2020    CALCIUM 7.6 (L) 2020    MG 1.8 2020    PHOS 2.9 2020     LFTs:   Lab Results   Component Value Date    PROT 3.6 (L) 2020    ALBUMIN 2.0 (L) 2020    BILITOT 1.5 (H) 2020 "     (H) 02/07/2020    ALKPHOS 37 (L) 02/07/2020    ALT 74 (H) 02/07/2020     Coags:   Lab Results   Component Value Date    INR 1.7 02/07/2020     FLP:   Lab Results   Component Value Date    CHOL 151 12/27/2019    HDL 66 12/27/2019    LDLCALC 74.2 12/27/2019    TRIG 54 12/27/2019    CHOLHDL 43.7 12/27/2019     DM:   Lab Results   Component Value Date    HGBA1C 5.2 08/11/2017    LDLCALC 74.2 12/27/2019    CREATININE 1.9 (H) 02/07/2020     Thyroid:   Lab Results   Component Value Date    TSH 5.050 12/27/2019    FREET4 0.92 10/08/2010     Anemia:   Lab Results   Component Value Date    IRON 136 02/06/2020    TIBC 167 (L) 02/06/2020    FERRITIN 422 (H) 02/06/2020    SJNGPFYL95 >2000 (H) 06/17/2014    FOLATE 14.9 06/17/2014     Cardiac:   Lab Results   Component Value Date    TROPONINI 2.225 (HH) 02/07/2020     Urinalysis:   Lab Results   Component Value Date    LABURIN (A) 02/06/2020     GRAM NEGATIVE STONEY, LACTOSE   >100,000 cfu/ml  Identification and susceptibility pending      COLORU Yellow 02/06/2020    SPECGRAV 1.015 02/06/2020    NITRITE Negative 02/06/2020    KETONESU Negative 02/06/2020    UROBILINOGEN Negative 02/06/2020    WBCUA >100 (H) 02/06/2020        Radiology:  Data Reviewed: yes  XR CHEST 1 VIEW  XR CHEST AP PORTABLE     X-Ray Chest AP Portable   Order: [id number]   Status:  Final result   Visible to patient:  No (Not Released) Next appt:  None Dx:  Hemorrhagic shock   Details     Reading Physician Reading Date Result Priority   Nena Stovall MD 2/7/2020       Narrative     PROCEDURE:   XR CHEST AP PORTABLE  dated  2/7/2020 4:12 AM    CLINICAL HISTORY:   Female 89 years of age.   ANY CHANGES    TECHNIQUE: AP view of the chest obtained portably at 4:12 AM.    PREVIOUS STUDIES:  February 6, 2020 at 11:22 AM    FINDINGS: Right IJ central vascular catheter terminates in the lower  SVC. Endotracheal tube tip is 3 cm above the arianne. NG tube has been  advanced. The tip is in the stomach,  the sidehole is at the level of  the GE junction. There are median sternotomy wires and mediastinal  surgical clips. Cardiac mediastinal contours are stable. The extensive  interstitial infiltrate in the groundglass throughout the right lung  has worsened. There is no pleural effusion.    IMPRESSION:      1. Slight increase in size of very small right pneumothorax.  2. Worsened diffuse infiltrate of the right lung.             Current Medications:     Infusions:   sodium chloride 0.9% 75 mL/hr at 02/07/20 0000    naloxone (NARCAN) infusion 0.4 mg/hr (02/07/20 0800)    norepinephrine bitartrate-D5W 2.498 mcg/kg/min (02/07/20 0815)    pantoprozole (PROTONIX) IV infusion 8 mg/hr (02/07/20 0753)    phenylephrine 5.497 mcg/kg/min (02/07/20 0450)    vasopressin Stopped (02/07/20 0100)        Scheduled:   chlorhexidine  15 mL Mouth/Throat BID    mupirocin   Nasal BID    piperacillin-tazobactam (ZOSYN) IVPB  4.5 g Intravenous Q12H    sodium chloride 0.9%  1,000 mL Intravenous Once    vancomycin (VANCOCIN) IVPB  1,000 mg Intravenous Once        PRN:  sodium chloride, sodium chloride, calcium chloride IVPB, calcium chloride IVPB, calcium chloride IVPB, dextrose 50%, dextrose 50%, fentaNYL **FOLLOWED BY** [START ON 2/8/2020] fentaNYL, glucagon (human recombinant), glucose, glucose, magnesium oxide, magnesium sulfate IVPB, magnesium sulfate IVPB, magnesium sulfate IVPB, magnesium sulfate IVPB, morphine, naloxone, potassium chloride in water, potassium chloride in water, potassium chloride in water, potassium chloride in water, potassium chloride, potassium chloride, potassium chloride, potassium chloride, sodium chloride 0.9%, sodium phosphate IVPB, sodium phosphate IVPB, sodium phosphate IVPB, sodium phosphate IVPB, sodium phosphate IVPB, Pharmacy to dose Vancomycin consult **AND** vancomycin - pharmacy to dose      Microbiology Data:  Reviewed: yes  Microbiology Results (last 7 days)     Procedure Component Value  Units Date/Time    Culture, Respiratory with Gram Stain [[id number]] Collected:  02/06/20 1026    Order Status:  Completed Specimen:  Endotracheal from Sputum Updated:  02/07/20 0844     Respiratory Culture Reduced Normal Respiratory eze with yeast present    Urine culture [[id number]]  (Abnormal) Collected:  02/06/20 1427    Order Status:  Completed Specimen:  Urine Updated:  02/07/20 0808     Urine Culture, Routine GRAM NEGATIVE STONEY, LACTOSE   >100,000 cfu/ml  Identification and susceptibility pending      Narrative:       Preferred Collection Type->Urine, Clean Catch  Specimen Source->Urine    Blood culture x two cultures. Draw prior to antibiotics [[id number]]     Order Status:  Canceled Specimen:  Blood     Blood culture x two cultures. Draw prior to antibiotics [[id number]]     Order Status:  Canceled Specimen:  Blood            Antibiotics and Day Number of Therapy:  Antibiotics (From admission, onward)    Start     Stop Route Frequency Ordered    02/07/20 1000  vancomycin in dextrose 5 % 1 gram/250 mL IVPB 1,000 mg      -- IV Once 02/07/20 0908    02/07/20 0918  vancomycin - pharmacy to dose  (vancomycin IVPB)      -- IV pharmacy to manage frequency 02/07/20 0818    02/07/20 0430  piperacillin-tazobactam 4.5 g in dextrose 5 % 100 mL IVPB (ready to mix system)      -- IV Every 12 hours (non-standard times) 02/06/20 2259    02/06/20 2100  mupirocin 2 % ointment  (MRSA Decolonization Orders [facility])      02/11 2059 Nasl 2 times daily 02/06/20 1452         Antivirals (From admission, onward)    None             Assessment/Plan:     Sravani Beck is a 89 y.o.female with         GI hemorrhage  Persistent Shock, multifactorial 2/2 above along with sepsis 2/2 UTI and aspiration PNA    - h/h stable, no further overt bleeding   - xarelto held   - continue PPI   - GI performed EGD yesterday, largely unremarkable   - continue pressor support   - on zosyn, vanc added this morning, f/u culture data     Acute  hypoxemic respiratory failure    - currently intubated on vent - mgmt per pulmonary     Aspiration PNA / UTI     - cover with zosyn for now    - follow cultures     AGMA    - multifactorial     Chronic anticoagulation therapy with xarelto     - xarelto on hold      Afib with RVR    - treat underlying hypotension as above    Elevated troponin   - trend, likely due to demand ischemia   - has echo pending   - cardiology evaluation    SHERITA      - 2/2 hypovolemia/shock, ATN      Tiny R apical PTX     Hypokalemia     - replace     ASCVD, CAD s/p CABGx4, hx R MCA embolic stroke     - po meds held         Hx HTN                DNR    Discussed case with pt's daughter at bedside and answered all of her questions. Discussed that she is gravely ill and overall prognosis is poor.        Chong Hoskins  [facility] Medicine

## 2020-02-07 NOTE — PLAN OF CARE
Problem: Nutrition Impairment (Mechanical Ventilation, Invasive)  Goal: Optimal Nutrition Delivery  Outcome: Ongoing, Progressing     Recommendation/Intervention: 1.) Recommend initiate nutrition support if unable to extubate pt w/in 24-48 hrs 2.) RD to follow and monitor # days NPO  Goals: 1.) PO diet initiated or nutrition support initiated w/in 24-48 hrs

## 2020-02-07 NOTE — ASSESSMENT & PLAN NOTE
· It is becoming increasingly evident that Ms. Beck will not likely survive her current illness.  · I discussed this with 2 of her children who are aware and understanding, but they are waiting on their sister Nidia to arrive before making any definitive decisions.  · In the event of cardiopulmonary arrest will not initiate ACLS.

## 2020-02-07 NOTE — CARE UPDATE
02/06/20 1925   Patient Assessment/Suction   Level of Consciousness (AVPU) alert   Respiratory Effort Unlabored   Expansion/Accessory Muscles/Retractions no use of accessory muscles   All Lung Fields Breath Sounds clear;coarse   Rhythm/Pattern, Respiratory assisted mechanically   Cough Frequency with stimulation   Cough Type assisted   Suction Method tracheal   $ Suction Charges Inline Suction Procedure Stat Charge   Secretions Amount moderate   Secretions Color brown   Secretions Characteristics thick   PRE-TX-O2   O2 Device (Oxygen Therapy) ventilator   $ Is the patient on Low Flow Oxygen? Yes   Oxygen Concentration (%) 45   Pulse Oximetry Type Continuous   $ Pulse Oximetry - Multiple Charge Pulse Oximetry - Multiple   Pulse (!) 148   Resp (!) 30   /62   Positioning   Head of Bed (HOB) HOB elevated        Airway - Non-Surgical 02/06/20 0921 Endotracheal Tube   Placement Date/Time: 02/06/20 0921   Present Prior to Hospital Arrival?: No  Inserted by: MD  Staff/Resident Name(s): gian ordoñez  Airway Device: Endotracheal Tube  Airway Device Size: 7.0  Style: Cuffed  Cuff Inflation: Minimal occlusive pressure  Cuf...   Secured at 23 cm   Measured At Lips   Secured Location Center   Secured by Commercial tube wright   Bite Block none   Site Condition Cool;Dry   Status Intact;Secured   Site Assessment Clean;Dry   Vent Select   Conventional Vent Y   Charged w/in last 24h YES   Preset Conventional Ventilator Settings   Vent ID [id number]   Vent Type    Ventilation Type VC   Vent Mode A/C   Humidity HME   Set Rate 30 BPM   Vt Set 400 mL   PEEP/CPAP 5 cmH20   Pressure Support 0 cmH20   Waveform RAMP   Peak Flow 55 L/min   Plateau Set/Insp. Hold (sec) 0   Trigger Sensitivity Flow/I-Trigger 3 L/min   Patient Ventilator Parameters   Resp Rate Total 30 br/min   Peak Airway Pressure 35 cmH2O   Mean Airway Pressure 16 cmH20   Plateau Pressure 0 cmH20   Exhaled Vt 423 mL   Total Ve 12.2 mL   I:E Ratio Measured 1:1.50    Conventional Ventilator Alarms   Alarms On Y   Resp Rate High Alarm 45 br/min   Press High Alarm 60 cmH2O   Apnea Rate 10   Apnea Volume (mL) 0 mL   Apnea Oxygen Concentration  100   Apnea Flow Rate (L/min) 44   T Apnea 20 sec(s)   Ready to Wean/Extubation Screen   FIO2<=50 (chart decimal) 0.45   MV<16L (chart vol.) 12.2   PEEP <=8 (chart #) 5   Ready to Wean Parameters   F/VT Ratio<105 (RSBI) (!) 70.92   Respiratory Evaluation   $ Care Plan Tech Time 15 min

## 2020-02-07 NOTE — SIGNIFICANT EVENT
The patient was terminally extubated at the family's request.  Her pressors were discontinued.  She was pronounced dead at 1432..  The family was in attendance.

## 2020-02-07 NOTE — CONSULTS
[facility]  Cardiology  Consult Note    Patient Name: Sravani Beck  MRN: [id number]  Admission Date: 2/6/2020  Hospital Length of Stay: 1 days  Code Status: Partial Code   Attending Provider: Chong Hoskins MD   Consulting Provider: Elina Robertson NP  Primary Care Physician: Gracia Martinez MD  Principal Problem:<principal problem not specified>    Patient information was obtained from relative(s), past medical records and ER records.     Inpatient consult to Cardiology  Consult performed by: Elina Robertson NP  Consult ordered by: Chong Hoskins MD        Subjective:     Chief Complaint:  Unresponsive, GI bleed    From H&P: Sravani Beck is a 89 y.o. female with hx of CAD s/p CABG, PAF w/ hx of embolic stroke on chronic anticoagulation with xarelto, HTN, who presented to the ED via EMS.   Pt is currently intubated; hx obtained from chart review and family at bedside.   Per her son, she has had n/v and loose stools for the past several days. He went to check on her and found her unresponsive, lying on the ground.   EMS reported that on their arrival she was severely hypotensive with SBP in the 60s and 70s and tachycardic, in and out of afib. She was also markedly bradypneic with an undetectable SpO2 on arrival and was intubated. Around the time of her intubation, she had coffee-ground emesis. After an NGT was placed, she a large volume of coffee ground gastric aspirate. She had central venous access placed and was given crystalloid and RBCs as well as started on vasopressors for persistent hypotension.    HPI: Ms. Beck is a 89 year old female who was brought to the ER via EMS after being found unresponsive at home by her family. In the ER, the patient was found to be anemic due to GI bleed and also has apparent sepsis. Patient has been severely hypotensive and is intubated on pressors in the ICU at this time. She is however alert.     Currently she is in afib with RVR in the 120s. She has a  PMH positive for Afib, CAD, CABG, HTN, Hyperlipidemia, and dementia. She has tolerated Xarelto 15 mg po daily for quite some time without issue until now. However she apparently has had loose stool over the past few days before this episode.     Past Medical History:   Diagnosis Date    AF (paroxysmal atrial fibrillation)     CAD (coronary artery disease) 1970    CABG x 4    HTN (hypertension), benign     Hyperlipidemia LDL goal <70        Past Surgical History:   Procedure Laterality Date    CARDIOVERSION  2/05 & 10/05    CHOLECYSTECTOMY      CORONARY ARTERY BYPASS GRAFT  1994    x 3    ESOPHAGOGASTRODUODENOSCOPY N/A 2/6/2020    Procedure: EGD (ESOPHAGOGASTRODUODENOSCOPY);  Surgeon: Austin Barone III, MD;  Location: [facility];  Service: Endoscopy;  Laterality: N/A;    HYSTERECTOMY         Review of patient's allergies indicates:   Allergen Reactions    No known drug allergies        No current facility-administered medications on file prior to encounter.      Current Outpatient Medications on File Prior to Encounter   Medication Sig    atorvastatin (LIPITOR) 40 MG tablet Take 40 mg by mouth once daily.     cyanocobalamin 1,000 mcg/mL injection Inject 1,000 mcg into the skin every 3 (three) months.    lisinopril 10 MG tablet Take 1 tablet (10 mg total) by mouth once daily.    rivaroxaban (XARELTO) 15 mg Tab Take 15 mg by mouth daily with dinner or evening meal.     Family History     Problem Relation (Age of Onset)    Cerebral aneurysm Mother        Tobacco Use    Smoking status: Never Smoker    Smokeless tobacco: Never Used   Substance and Sexual Activity    Alcohol use: Yes     Alcohol/week: 7.0 standard drinks     Types: 7 Glasses of wine per week    Drug use: No    Sexual activity: Not on file     ROS     Unable to obtain ros due to intubation   Objective:     Vital Signs (Most Recent):  Temp: 97 °F (36.1 °C) (02/07/20 0301)  Pulse: (!) 135 (02/07/20 0915)  Resp: (!) 32 (02/07/20  0915)  BP: (!) 127/55 (02/07/20 0717)  SpO2: 98 % (02/07/20 0915) Vital Signs (24h Range):  Temp:  [97 °F (36.1 °C)-98.2 °F (36.8 °C)] 97 °F (36.1 °C)  Pulse:  [102-197] 135  Resp:  [23-45] 32  SpO2:  [74 %-99 %] 98 %  BP: ()/() 127/55  Arterial Line BP: ()/(29-61) 67/46     Weight: 55 kg (121 lb 4.1 oz)  Body mass index is 19.57 kg/m².    SpO2: 98 %  O2 Device (Oxygen Therapy): ventilator      Intake/Output Summary (Last 24 hours) at 2/7/2020 1146  Last data filed at 2/7/2020 0620  Gross per 24 hour   Intake 4823.27 ml   Output 400 ml   Net 4423.27 ml       Lines/Drains/Airways     Central Venous Catheter Line                 Percutaneous Central Line Insertion/Assessment - triple lumen  02/06/20 1103 right subclavian 1 day          Airway                 Airway - Non-Surgical 02/06/20 0921 Endotracheal Tube 1 day          Arterial Line                 Arterial Line 02/06/20 1016 Left Radial 1 day          Peripheral Intravenous Line                 Peripheral IV - Single Lumen 02/06/20 0909 24 G Right Hand 1 day         Peripheral IV - Single Lumen 02/06/20 0911 20 G Right Upper Arm 1 day                Physical Exam     HEENT: Normocephalic, atraumatic,   PERRL, Conjunctiva pink, no scleral icterus.   NECK:  No JVD no carotid bruit  CVS: S1S2+, irregular rate and rhythm, JVP: Normal.  LUNGS: intubated   ABDOMEN: Soft, NT, BS+  EXTREMITIES: No cyanosis, clubbing or edema; bruising noted  Gu: + hodges catheter, denies dysuria, no hematuria  NEURO: alert  SKIN: bruising noted       Significant Labs:   Recent Lab Results       02/07/20  1146   02/07/20  1038   02/07/20  1032   02/07/20  0758   02/07/20  0721        Procalcitonin               Albumin               Alkaline Phosphatase               Allens Test N/A             ALT               Anion Gap               Aniso               aPTT               AST               BANDS               Baso #               Basophil%               BILIRUBIN  TOTAL               Site [facility]             BUN, Bld               Calcium               Chloride               CO2               Creatinine               DelSys Adult Vent             Differential Method               eGFR if                eGFR if non                Eos #               Eosinophil%               FiO2 0.45             Large/Giant Platelets               Glucose               Gran # (ANC)               Gran%               Hematocrit     36.5   38.6     Hemoglobin     12.8   13.3     Immature Grans (Abs)               Immature Granulocytes               INR               Lactate, Steve       3.1  Comment:  Falsely low lactic acid results can be found in samples   containing >=13.0 mg/dL total bilirubin and/or >=3.5 mg/dL   direct bilirubin.  Lactic Acid critical result(s) repeated. Called and verbal readback   obtained from Alfredo Ardon RN ICU3.  by CW1 02/07/2020 09:17         Lymph #               Lymph%               Magnesium               MCH     31.2   30.7     MCHC     35.1   34.5     MCV     89   89     Min Vol               Mode AC/PRVC             Mono #               Mono%               MPV     13.9   12.5     nRBC               PEEP 5             Phosphorus               PiP               Platelet Estimate               Platelets     41   46     POC BE -18             POC Glucose 262             POC HCO3 11.2             POC Hematocrit 34             POC Ionized Calcium 1.06             POC PCO2 32.3             POC PH 7.149             POC PO2 97             POC Potassium 3.7             POC SATURATED O2 95             POC Sodium 122             POC TCO2 12             Poik               Poly               Potassium               PROTEIN TOTAL               PT               Rate 3             RBC     4.10   4.33     RDW     17.1   17.3     Sample ARTERIAL             Sodium               Sp02               Troponin I       2.225  Comment:  Troponin  critical result(s) repeated. Called and verbal readback   obtained from Alfredo Ardon RN ICU3.  by CW1 02/07/2020 09:17         Vancomycin, Random   <3.5           Vt 400             WBC     8.90   9.47                      02/07/20  0508   02/07/20  0313   02/06/20  2243   02/06/20  2243   02/06/20 2020        Procalcitonin   20.28  Comment:  Procalcitonin Result Interpretation:  <=0.50 ng/mL  Systemic infection (sepsis) is not likely. Local bacterial infection   is   possible.  >0.50 and <= 2.00 ng/mL  Systemic infection (sepsis) is possible, but other conditions are   also known   to elevate procalcitonin.   >2.00 ng/mL  Systemic infection (sepsis) is likely unless other causes are known.  >=10.00 ng/mL  Important systemic inflammatory response, almost exclusively due to   severe   bacterial sepsis or septic shock.             Albumin   2.0   1.9       Alkaline Phosphatase   37   33       Allens Test N/A   N/A         ALT   74   67       Anion Gap   10   12       Aniso   Slight   Slight       aPTT   30.8  Comment:  Therapeutic Range of 67.5-105.1 secs.  Equivalent to Heparin Concentration of anti-Xa 0.3-0.7 IU/ml.     37.1  Comment:  Therapeutic Range of 67.5-105.1 secs.  Equivalent to Heparin Concentration of anti-Xa 0.3-0.7 IU/ml.         AST   180   152       BANDS   25.0           Baso #       0.06       Basophil%   0.0   0.8       BILIRUBIN TOTAL   1.5  Comment:  For infants and newborns, interpretation of results should be based  on gestational age, weight and in agreement with clinical  observations.  Premature Infant recommended reference ranges:  Up to 24 hours.............<8.0 mg/dL  Up to 48 hours............<12.0 mg/dL  3-5 days..................<15.0 mg/dL  6-29 days.................<15.0 mg/dL     1.4  Comment:  For infants and newborns, interpretation of results should be based  on gestational age, weight and in agreement with clinical  observations.  Premature Infant recommended reference  ranges:  Up to 24 hours.............<8.0 mg/dL  Up to 48 hours............<12.0 mg/dL  3-5 days..................<15.0 mg/dL  6-29 days.................<15.0 mg/dL         Site [facility]   [facility]         BUN, Bld   61   64       Calcium   7.6   6.8  Comment:  ca critical result(s) repeated. Called and verbal readback obtained   from david ramos rn mi3 by HBS 02/07/2020 00:03         Chloride   102   105       CO2   14   13       Creatinine   1.9   1.8       DelSys Adult Vent   Adult Vent         Differential Method   Manual   Automated       eGFR if    25.5   27.2       eGFR if non    22.1  Comment:  Calculation used to obtain the estimated glomerular filtration  rate (eGFR) is the CKD-EPI equation.      23.6  Comment:  Calculation used to obtain the estimated glomerular filtration  rate (eGFR) is the CKD-EPI equation.          Eos #       0.1       Eosinophil%   1.0   0.7       FiO2 45   45         Large/Giant Platelets   Present           Glucose   110   157       Gran # (ANC)       3.5       Gran%   21.0   48.7       Hematocrit   37.8   35.4 40.4        37.8   35.4       Hemoglobin   13.3   11.9 14.2        13.3   11.9       Immature Grans (Abs)   CANCELED  Comment:  Mild elevation in immature granulocytes is non specific and   can be seen in a variety of conditions including stress response,   acute inflammation, trauma and pregnancy. Correlation with other   laboratory and clinical findings is essential.    Result canceled by the ancillary.     0.12  Comment:  Mild elevation in immature granulocytes is non specific and   can be seen in a variety of conditions including stress response,   acute inflammation, trauma and pregnancy. Correlation with other   laboratory and clinical findings is essential.         Immature Granulocytes   CANCELED  Comment:  Result canceled by the ancillary.   1.7       INR   1.7  Comment:  Coumadin Therapy:  INR: 2.0-3.0 conventional  anticoagulation  INR: 2.5-3.5 intensive anticoagulation     2.2  Comment:  Coumadin Therapy:  INR: 2.0-3.0 conventional anticoagulation  INR: 2.5-3.5 intensive anticoagulation         Lactate, Steve               Lymph #       3.2       Lymph%   49.0   45.4       Magnesium   1.8   2.4       MCH   31.4   30.5 31.3        31.4   30.5       MCHC   35.2   33.6 35.1        35.2   33.6       MCV   89   91 89  Comment:  Delta check review        89   91       Min Vol 12.4   12.3         Mode AC/PRVC   AC/PRVC         Mono #       0.2       Mono%   4.0   2.7       MPV   12.7   13.5 12.0        12.7   13.5       nRBC   21   20       PEEP 5   5         Phosphorus   2.9   3.1       PiP 30   3         Platelet Estimate   Decreased   Decreased       Platelets   49   63 70        49   63       POC BE -12   -13         POC Glucose 106   147         POC HCO3 13.9   14.1         POC Hematocrit 38   34         POC Ionized Calcium 1.09   1.09         POC PCO2 27.1   32.3         POC PH 7.319   7.247         POC PO2 68   76         POC Potassium 3.6   3.9         POC SATURATED O2 92   93         POC Sodium 126   129         POC TCO2 15   15         Poik   Slight   Slight       Poly   Occasional   Occasional       Potassium   3.1   3.8       PROTEIN TOTAL   3.6   3.4       PT   18.9   23.3       Rate 30   30         RBC   4.24   3.90 4.53        4.24   3.90       RDW   16.8   16.6 16.1        16.8   16.6       Sample ARTERIAL   ARTERIAL         Sodium   126   130       Sp02 92   98         Troponin I               Vancomycin, Random               Vt 400   400         WBC   8.32   7.12 9.87        8.32   7.12                            Significant Imaging:    Chest xray:  IMPRESSION:      1. Slight increase in size of very small right pneumothorax.  2. Worsened diffuse infiltrate of the right lung.  Assessment and Plan:    1. Hemorrhagic shock  2. Blood loss due to GI bleed  3. Sepsis   4.     1. Continue supportive measures for now.  2.  Will need management of Afib for rate control as she improves clinically. At this time, her HR is multifactoral due to sepsis and pressors.   3. Will follow.      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] 02/07/2020 Yes    Aspiration pneumonia [J69.0] 02/07/2020 Yes    DIC (disseminated intravascular coagulation) [D65] 02/07/2020 Yes    Shock liver [K72.00] 02/07/2020 Yes    SHERITA (acute kidney injury) [N17.9] 02/07/2020 Yes    ATN (acute tubular necrosis) [N17.0] 02/07/2020 Yes    Pneumothorax [J93.9] 02/07/2020 Yes    Hemorrhagic shock [R57.8] 02/06/2020 Yes    Encephalopathy, metabolic [G93.41] 02/06/2020 Yes    Acute blood loss anemia [D62] 02/06/2020 Yes    On anticoagulant therapy [Z79.01] 02/06/2020 Not Applicable    Thrombocytopenia due to blood loss [D69.59] 02/06/2020 Yes    Elevated lactic acid level [R79.89] 02/06/2020 Yes    Hypokalemia [E87.6] 02/06/2020 Yes    History of embolic stroke [Z86.73] 02/06/2020 Not Applicable    Cystitis [N30.90] 02/06/2020 Yes    Arteriosclerotic cerebrovascular disease [I67.2] 09/07/2017 Yes    Paroxysmal atrial fibrillation [I48.0] 04/29/2012 Yes    CAD (coronary artery disease) [I25.10] 04/29/2012 Yes      Problems Resolved During this Admission:       VTE Risk Mitigation (From admission, onward)         Ordered     Reason for No Pharmacological VTE Prophylaxis  Once     Question:  Reasons:  Answer:  Active Bleeding    02/06/20 1120     IP VTE HIGH RISK PATIENT  Once      02/06/20 1120     Place KIRSTEN hose  Until discontinued      02/06/20 1120     Place sequential compression device  Until discontinued      02/06/20 1120                Thank you for your consult. I will  follow-up with patient. Please contact us if you have any additional questions.    Elina Robertson NP  Cardiology   On license of [facility]

## 2020-02-07 NOTE — PLAN OF CARE
02/07/20 0717   Patient Assessment/Suction   Level of Consciousness (AVPU) alert   Respiratory Effort Normal;Unlabored   Expansion/Accessory Muscles/Retractions no use of accessory muscles;no retractions   All Lung Fields Breath Sounds coarse   Rhythm/Pattern, Respiratory assisted mechanically;unlabored;pattern regular;depth regular   PRE-TX-O2   O2 Device (Oxygen Therapy) ventilator   $ Is the patient on Low Flow Oxygen? Yes   Oxygen Concentration (%) 45   SpO2 95 %   Pulse Oximetry Type Continuous   $ Pulse Oximetry - Multiple Charge Pulse Oximetry - Multiple   Pulse (!) 126   BP (!) 127/55        Airway - Non-Surgical 02/06/20 0921 Endotracheal Tube   Placement Date/Time: 02/06/20 0921   Present Prior to Hospital Arrival?: No  Inserted by: MD  Staff/Resident Name(s): gian ordoñez  Airway Device: Endotracheal Tube  Airway Device Size: 7.0  Style: Cuffed  Cuff Inflation: Minimal occlusive pressure  Cuf...   Secured at 23 cm   Measured At Lips   Secured Location Right   Secured by Commercial tube wright   Bite Block none   Site Condition Cool;Dry   Status Intact;Secured;Patent   Site Assessment Clean;Dry   Vent Select   Conventional Vent Y   Charged w/in last 24h YES   IHI Ventilator Associated Pneumonia Bundle   Head of Bed Elevated  HOB 30   Preset Conventional Ventilator Settings   Vent ID [id number]   Vent Type    Ventilation Type VC   Vent Mode A/C   Humidity HME   Set Rate 30 BPM   Vt Set 400 mL   PEEP/CPAP 5 cmH20   Pressure Support 0 cmH20   Waveform RAMP   Peak Flow 55 L/min   Plateau Set/Insp. Hold (sec) 0   Trigger Sensitivity Flow/I-Trigger 3 L/min   Patient Ventilator Parameters   Resp Rate Total 32 br/min   Peak Airway Pressure 24 cmH2O   Mean Airway Pressure 12 cmH20   Plateau Pressure 0 cmH20   Exhaled Vt 406 mL   Total Ve 13.1 mL   I:E Ratio Measured 1:1.50   Conventional Ventilator Alarms   Alarms On Y   Resp Rate High Alarm 50 br/min   Press High Alarm 60 cmH2O   Apnea Rate 10   Apnea Volume (mL) 0  mL   Apnea Oxygen Concentration  100   Apnea Flow Rate (L/min) 44   T Apnea 20 sec(s)

## 2020-02-07 NOTE — PROGRESS NOTES
"[facility]  Pulmonology  Progress Note    Subjective     Profound shock persists.  No additional evidence ongoing blood loss.  Remains intubated but not requiring sedation.  Shakes her head "no" when asked if she is in any pain.  Overnight events noted. See Dr. Chaudhry's note.    Review of Systems   Unable to perform ROS: Intubated      I have personally reviewed the following during today's evaluation:  past medical history, ROS, family history, social history, surgical history, current inpatient medications,drug allergies, vital signs over the past 24 hours, results of relevant diagnostic studies and nursing/provider documentation from the past 24 hours.     Objective     VS Temp:  [97 °F (36.1 °C)-98.2 °F (36.8 °C)]   Pulse:  [102-197]   Resp:  [23-45]   BP: ()/(47-86)   SpO2:  [74 %-100 %]   Arterial Line BP: ()/(29-68)   Ideal body weight: 59.3 kg (130 lb 11.7 oz)   I/O   Intake/Output Summary (Last 24 hours) at 2/7/2020 1252  Last data filed at 2/7/2020 0620  Gross per 24 hour   Intake 4823.27 ml   Output 400 ml   Net 4423.27 ml        Vent SpO2 (!) 91%      PE Physical Exam   Constitutional: She appears well-developed. She appears cachectic.  Non-toxic appearance. No distress. She is sedated, intubated and restrained. She is not obese.   HENT:   Head: Normocephalic and atraumatic.   Right Ear: External ear normal.   Left Ear: External ear normal.   Nose: Nose normal.   Mouth/Throat: Uvula is midline and mucous membranes are normal. Abnormal dentition. Mallampati Score: unable to assess.   Neck: Trachea normal and normal range of motion. Neck supple. No JVD present. No tracheal deviation present. No thyromegaly present.   Cardiovascular: Normal rate, regular rhythm, normal heart sounds and intact distal pulses. Exam reveals no gallop and no friction rub.   No murmur heard.  Pulmonary/Chest: Normal expansion, symmetric chest wall expansion, effort normal and breath sounds normal. She " is intubated. She has no wheezes. She has no rhonchi. She has no rales.   Well healed sternotomy   Abdominal: Soft. Bowel sounds are normal. She exhibits no distension and no mass. There is no tenderness. No hernia.   Musculoskeletal: Normal range of motion. She exhibits no edema or deformity.   Lymphadenopathy: No supraclavicular adenopathy is present.     She has no cervical adenopathy.     She has no axillary adenopathy.   Neurological: She is unresponsive. She displays no atrophy. She exhibits normal muscle tone. She displays no seizure activity. GCS eye subscore is 1. GCS verbal subscore is 1. GCS motor subscore is 6.   Skin: Skin is warm, dry and intact. No rash noted. No cyanosis. Nails show no clubbing.   Nursing note and vitals reviewed.      Labs I have personally reviewed and interpreted all labs / diagnostic studies obtained over the past 24 hours, and relevant results are as follows:  Recent Labs   Lab 02/07/20  0313  02/07/20  0758 02/07/20  1032 02/07/20  1146   WBC 8.32  8.32   < >  --  8.90  --    RBC 4.24  4.24   < >  --  4.10  --    HGB 13.3  13.3   < >  --  12.8  --    HCT 37.8  37.8   < >  --  36.5* 34*   PLT 49*  49*   < >  --  41*  --    MCV 89  89   < >  --  89  --    MCH 31.4*  31.4*   < >  --  31.2*  --    MCHC 35.2  35.2   < >  --  35.1  --    *  --   --   --   --    K 3.1*  --   --   --   --      --   --   --   --    CO2 14*  --   --   --   --    BUN 61*  --   --   --   --    CREATININE 1.9*  --   --   --   --    MG 1.8  --   --   --   --    ALT 74*  --   --   --   --    *  --   --   --   --    ALKPHOS 37*  --   --   --   --    BILITOT 1.5*  --   --   --   --    PROT 3.6*  --   --   --   --    ALBUMIN 2.0*  --   --   --   --    PH  --    < >  --   --  7.149*   PCO2  --    < >  --   --  32.3*   PO2  --    < >  --   --  97   HCO3  --    < >  --   --  11.2*   POCSATURATED  --    < >  --   --  95   BE  --    < >  --   --  -18   INR 1.7  --   --   --   --    APTT  30.8  --   --   --   --    TROPONINI  --   --  2.225*  --   --     < > = values in this interval not displayed.     PCT:  20.28  Lactate:  3.1  Vanc;  <3.5   Imaging I have personally reviewed and interpreted the following images and reviewed the associated Radiology report.  CXR: I have reviewed all pertinent results/findings within the past 24 hours and my personal findings are:  slight increase in small right apical PTX.  Dense airspace opacities in the right lung.     Micro I have personally reviewed and interpreted the available culture data.  Relevant results are as follows.  Blood Culture No results found for: LABBLOO and Sputum Culture   Lab Results   Component Value Date    RESPIRATORYC  02/06/2020     Reduced Normal Respiratory eze with yeast present      Medications Scheduled    chlorhexidine  15 mL Mouth/Throat BID    mupirocin   Nasal BID    pantoprazole  40 mg Intravenous Daily    piperacillin-tazobactam (ZOSYN) IVPB  4.5 g Intravenous Q12H    sodium chloride 0.9%  1,000 mL Intravenous Once    vancomycin (VANCOCIN) IVPB  1,000 mg Intravenous Once      Continuous Infusions:    sodium chloride 0.9% 75 mL/hr at 02/07/20 0000    naloxone (NARCAN) infusion 0.4 mg/hr (02/07/20 0800)    norepinephrine bitartrate-D5W 2.498 mcg/kg/min (02/07/20 1228)    phenylephrine 5.497 mcg/kg/min (02/07/20 0450)    vasopressin Stopped (02/07/20 0100)      PRN   sodium chloride, sodium chloride, calcium chloride IVPB, calcium chloride IVPB, calcium chloride IVPB, dextrose 50%, dextrose 50%, fentaNYL **FOLLOWED BY** [START ON 2/8/2020] fentaNYL, glucagon (human recombinant), glucose, glucose, magnesium oxide, magnesium sulfate IVPB, magnesium sulfate IVPB, magnesium sulfate IVPB, magnesium sulfate IVPB, morphine, naloxone, potassium chloride in water, potassium chloride in water, potassium chloride in water, potassium chloride in water, potassium chloride, potassium chloride, potassium chloride, potassium  chloride, sodium chloride 0.9%, sodium phosphate IVPB, sodium phosphate IVPB, sodium phosphate IVPB, sodium phosphate IVPB, sodium phosphate IVPB, Pharmacy to dose Vancomycin consult **AND** vancomycin - pharmacy to dose        Assessment       Active Hospital Problems    Diagnosis    Septic shock    Stress-induced cardiomyopathy    Hyponatremia    NSTEMI (non-ST elevated myocardial infarction)    SIADH (syndrome of inappropriate ADH production)    Aspiration pneumonia    DIC (disseminated intravascular coagulation)    Shock liver    SHERITA (acute kidney injury)    ATN (acute tubular necrosis)    Pneumothorax    Hemorrhagic shock    Encephalopathy, metabolic    Acute blood loss anemia    On anticoagulant therapy    Thrombocytopenia due to blood loss    Elevated lactic acid level    Hypokalemia    History of embolic stroke    Cystitis    Arteriosclerotic cerebrovascular disease    Paroxysmal atrial fibrillation    CAD (coronary artery disease)      My Impression:  Profound shock requiring multiple vasopressors likely represents developing aspiration pneumonia.  GIB appears to have resolved.  Overall, her prognosis is poor.    Plan     Neuro  · Intermittent fentanyl boluses to maintain light sedation.  · Daily spontaneous awakening trials.    Pulmonary  · Continue lung protective ventilation for now.  · Continue pip/tazo and add vancomycin  · For pneumonia.  Small non expanding pneumothorax noted.  Given her overall dismal prognosis I see no utility in placing a chest tube for this clinically inconsequential small pneumothorax.    Cardiac/Vascular  · Elevated troponin noted and echocardiography reviewed at the bedside.  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  · Hold DOAC.  · Bolus a L of balance crystalloid.  · Persistent shock likely a consequence of sepsis.  Continue to titrate norepinephrine and phenylephrine infusions to maintain a map greater than  65.  · Persistent /refractory acidemia suggests a poor prognosis.    Renal/  · Supportive care.  · Replete electrolytes as needed.  · Renal function is slowly worsening.    ID  · Continue pip/tazo and add vancomycin.  · Follow up culture data    Hematology  · Continue to trend H&H and transfuse to maintain H/H greater than 7/21.  · No compelling indication for additional blood products at this time.    GI  · GI following.  · Continue intermittent IV PPI.    Palliative Care  · It is becoming increasingly evident that Ms. Beck will not likely survive her current illness.  · I discussed this with 2 of her children who are aware and understanding, but they are waiting on their sister Nidia to arrive before making any definitive decisions.  · In the event of cardiopulmonary arrest will not initiate ACLS.       Negro Gurrola MD  Pulmonary / Critical Care Medicine  [facility]        Critical Care Time: Approximately 63 minutes    The patient is critically ill due to the following conditions that actively pose threat to life and bodily function:  Acidosis with a pH < 7.25, Hypoxia/hypoxemia with SpO2 < 80%, Pneumonia with HR > than 120 bpm, SBP < 80 mmHg, Sepsis / Septic Shock with SBP < 180 mmHg, Shock     The patient is at high risk of death due to central nervous system failure, circulatory failure, respiratory failure, metabolic decompensation and requiring ongoing treatment including invasive mechanical ventilation, titration of vasoactive medications, frequent assessment and management of volume status, frequent neurologic assessment to prevent further life-threatening deterioration.  Due to a high probability of clinically significant, life threatening deterioration, the patient required my highest level of preparedness to intervene emergently and I personally spent this critical care time directly and personally managing the patient.     Critical care was time spent personally by me on the  following activities:    Obtaining a history   Examination of patient.   Reviewing pulse oximetry.   Providing medical care at the patients bedside.   Developing a treatment plan with patient or surrogate and bedside caregivers   Ordering and reviewing laboratory studies, radiographic studies, pulse oximetry.   Ordering and performing treatments and interventions.   Evaluation of patient's response to treatment.   Discussions with consultants while on the unit and immediately available to the patient.   Re-evaluation of the patient's condition.   Documentation in the medical record.    This critical care time did not overlap with that of any other provider or involve time for any procedures.     Negro Gurrola MD  Pulmonary / Critical Care Medicine  [facility]  02/07/2020  12:52 PM

## 2020-02-07 NOTE — CODE/ RAPID DOCUMENTATION
Hospitalist Rapid Response Note    I was called emergently to bedside for worsening hypotension.  89-year-old female admitted after being found down unresponsive secondary to hemorrhagic shock from GI bleeding in the setting of chronic Xarelto use with comorbid respiratory failure, acute kidney injury, and suspected sepsis from aspiration pneumonia and UTI.  The patient cannot give history due to intubation/sedation and therefore history was obtained at bedside from chart review, nursing report, and family.  The patient has limited DNR status for which she will accept continued intubation and mechanical ventilation as well as IV medications however she will not receive electrical shocks or chest compressions. The patient had sudden worsening of hypotension tonight despite multiple vasopressors including Levophed and Donnell-Synephrine.  Patient received PRBC and FFP transfusion today and most recent H&H is normal.  Xarelto has been held and the patient received Kcentra.  The patient has not had any further bleeding.  Despite blood and volume resuscitation the patient has worsening hypotension likely secondary to septic shock.  Cultures currently pending and patient on empiric IV Zosyn for broad-spectrum coverage.  The patient has been afebrile.  Most recent ABG revealed metabolic acidosis with pH 7.1.  Stat ABG was repeated indicating pH 7.2.  The patient is receiving IV fluids as well as bolus IV fluid ordered earlier tonight.     On examination the patient appears frail and nontoxic. She has coarse rhonchi bilaterally but decent air movement with ventilator.  Patient has pupillary constriction 2 mm bilaterally. Patient is nonverbal and not following commands at this time.  2+ radial pulses.  Extremities warm and well perfused.  Mucous membranes are dry.  CVP 9cm.  Abdomen is soft and nondistended.     Further investigation reported by nursing that the patient had been receiving IV fentanyl infusion which may have been  incorrectly labeled as Levophed???  I asked nursing to investigate further and possibly report event as appropriate.   Subsequently the patient received IV Narcan 0.4 mg resulting in some improvement in mental status and blood pressure.    Impression:  Iatrogenic toxic encephalopathy and worsening hypotension secondary to IV fentanyl infusion (labeled as Levophed), improved after Narcan  Acute GI bleeding in the setting of chronic Xarelto use leading to acute blood loss anemia and secondary hemorrhagic shock status post transfusion  Likely component of persistent septic shock secondary to aspiration pneumonia and UTI/cystitis  Possible anoxic brain injury versus underlying metabolic encephalopathy from sepsis  Metabolic acidosis multifactorial  Acute hypoxemic respiratory failure secondary to shock/sepsis/GI bleed  Acute kidney injury secondary to shock/sepsis/GI bleed  Thrombocytopenia secondary to acute blood loss  Hypokalemia  Tiny right apical pneumothorax  CAD  Paroxysmal atrial fibrillation  History of embolic CVA    Plan:  Will avoid further sedation with IV fentanyl.  Consider low-dose intermittent IV morphine or Ativan as needed.  Give additional IV Narcan if patient has recurrent symptoms in near future as needed.   Continuous CVP monitoring.  Continue IV bolus at this time.  May need additional IV fluids.   Continue Levophed and Donnell-Synephrine.  Addition of vasopressin if needed for persistent septic shock.   Bicarb continuous IV infusion.  Serial ABG.   STAT labs done during RRT currently pending, will follow  Serial H&H monitoring.  Additional transfusion as needed.  Daily labs.  Daily chest x-ray.  Limited DNR status as above  Family at bedside aware of overall poor prognosis as well as iatrogenic over-sedation  This patient has a high risk of life-threatening deterioration secondary to multiorgan dysfunction in the setting of multifactorial shock  Critical care time 42 min    Darrian Chaudhry MD  [facility]  Hospitalist

## 2020-02-07 NOTE — PROGRESS NOTES
"[facility]  Adult Nutrition   Progress Note (Initial Assessment)     SUMMARY     Recommendations/Interventions:    Recommendation/Intervention: 1.) Recommend initiate nutrition support if unable to extubate pt w/in 24-48 hrs 2.) RD to follow and monitor # days NPO  Goals: 1.) PO diet initiated or nutrition support initiated w/in 24-48 hrs  Nutrition Goal Status: new    Dietitian Rounds Brief:    Pt assessed 2' ICU status. Currently intubated/sedated. Pt s/p EGD--noted esophagitis, + erosions per MD notes. No signs of bleeding noted per chart. Cardiology consulted--elevated troponins. Requiring pressor support. RD to follow & monitor pt status and need for alternate means of nutrition.    Reason for Assessment    Reason For Assessment: other (see comments)(ICU status)  Diagnosis: other (see comments)(hemorrhagic shock)  Relevant Medical History: Afib, CAD, CABG x 4, HTN, hyperlipidemia    Nutrition Risk Screen    Nutrition Risk Screen: no indicators present     MST Score: 0  Have you recently lost weight without trying?: No  Weight loss score: 0  Have you been eating poorly because of a decreased appetite?: No  Appetite score: 0       Nutrition/Diet History    Food Allergies: NKFA  Factors Affecting Nutritional Intake: on mechanical ventilation, NPO    Anthropometrics    Temp: 97 °F (36.1 °C)  Height Method: Estimated  Height: 5' 6" (167.6 cm)  Height (inches): 66 in  Weight Method: Bed Scale  Weight: 55 kg (121 lb 4.1 oz)  Weight (lb): 121.25 lb  Ideal Body Weight (IBW), Female: 130 lb  % Ideal Body Weight, Female (lb): 93.27 %  BMI (Calculated): 19.6  BMI Grade: 18.5-24.9 - normal       Weight History:  Wt Readings from Last 10 Encounters:   02/07/20 55 kg (121 lb 4.1 oz)   09/07/17 48.5 kg (107 lb)   08/22/17 49.2 kg (108 lb 7.5 oz)   08/11/17 48 kg (105 lb 13.1 oz)   10/08/15 51.8 kg (114 lb 3.2 oz)   06/20/14 51.3 kg (113 lb)   06/19/13 51.9 kg (114 lb 8 oz)   05/09/12 53.3 kg (117 lb 8 oz) "   04/26/12 52 kg (114 lb 11.2 oz)   08/26/11 55.9 kg (123 lb 5.2 oz)   }  Lab/Procedures/Meds: Pertinent Labs Reviewed  Clinical Chemistry:  Recent Labs   Lab 02/06/20  1538 02/06/20  2243 02/07/20  0313   * 130* 126*   K 2.8* 3.8 3.1*    105 102   CO2 16* 13* 14*   GLU 96 157* 110   BUN 69* 64* 61*   CREATININE 1.9* 1.8* 1.9*   CALCIUM 7.7* 6.8* 7.6*   PROT  --  3.4* 3.6*   ALBUMIN  --  1.9* 2.0*   BILITOT  --  1.4* 1.5*   ALKPHOS  --  33* 37*   AST  --  152* 180*   ALT  --  67* 74*   ANIONGAP 15 12 10   ESTGFRAFRICA 25.5* 27.2* 25.5*   EGFRNONAA 22.1* 23.6* 22.1*   MG 1.7 2.4 1.8   PHOS 3.2 3.1 2.9     CBC:   Recent Labs   Lab 02/07/20  1032   WBC 8.90   RBC 4.10   HGB 12.8   HCT 36.5*   PLT 41*   MCV 89   MCH 31.2*   MCHC 35.1     Cardiac Profile:  Recent Labs   Lab 02/07/20  0758   TROPONINI 2.225*       Medications: Pertinent Medications reviewed  Scheduled Meds:   chlorhexidine  15 mL Mouth/Throat BID    mupirocin   Nasal BID    piperacillin-tazobactam (ZOSYN) IVPB  4.5 g Intravenous Q12H    sodium chloride 0.9%  1,000 mL Intravenous Once    vancomycin (VANCOCIN) IVPB  1,000 mg Intravenous Once     Continuous Infusions:   sodium chloride 0.9% 75 mL/hr at 02/07/20 0000    naloxone (NARCAN) infusion 0.4 mg/hr (02/07/20 0800)    norepinephrine bitartrate-D5W 2.498 mcg/kg/min (02/07/20 0815)    pantoprozole (PROTONIX) IV infusion 8 mg/hr (02/07/20 0753)    phenylephrine 5.497 mcg/kg/min (02/07/20 0450)    vasopressin Stopped (02/07/20 0100)     PRN Meds:.sodium chloride, sodium chloride, calcium chloride IVPB, calcium chloride IVPB, calcium chloride IVPB, dextrose 50%, dextrose 50%, fentaNYL **FOLLOWED BY** [START ON 2/8/2020] fentaNYL, glucagon (human recombinant), glucose, glucose, magnesium oxide, magnesium sulfate IVPB, magnesium sulfate IVPB, magnesium sulfate IVPB, magnesium sulfate IVPB, morphine, naloxone, potassium chloride in water, potassium chloride in water, potassium chloride  in water, potassium chloride in water, potassium chloride, potassium chloride, potassium chloride, potassium chloride, sodium chloride 0.9%, sodium phosphate IVPB, sodium phosphate IVPB, sodium phosphate IVPB, sodium phosphate IVPB, sodium phosphate IVPB, Pharmacy to dose Vancomycin consult **AND** vancomycin - pharmacy to dose    Estimated/Assessed Needs    Weight Used For Calorie Calculations: 55 kg (121 lb 4.1 oz)  Energy Calorie Requirements (kcal): 1253   Energy Need Method: [facility]  Protein Requirements: 55-66 (0.8-1 g/kg)  Weight Used For Protein Calculations: 55 kg (121 lb 4.1 oz)     Estimated Fluid Requirement Method: RDA Method    Nutrition Prescription Ordered    Current Diet Order: NPO    Evaluation of Received Nutrient/Fluid Intake    IV Fluid (mL): 1800  Energy Calories Required: not meeting needs  Protein Required: not meeting needs  Fluid Required: meeting needs  Tolerance: other (see comments)(NPO)    % Meal Intake: NPO    Intake/Output Summary (Last 24 hours) at 2/7/2020 1112  Last data filed at 2/7/2020 0620  Gross per 24 hour   Intake 4823.27 ml   Output 400 ml   Net 4423.27 ml      Nutrition Risk    Level of Risk/Frequency of Follow-up: high     Monitor and Evaluation    Food and Nutrient Intake: energy intake  Food and Nutrient Adminstration: diet order  Physical Activity and Function: nutrition-related ADLs and IADLs  Anthropometric Measurements: weight change, weight  Biochemical Data, Medical Tests and Procedures: gastrointestinal profile, electrolyte and renal panel, glucose/endocrine profile  Nutrition-Focused Physical Findings: overall appearance       Nutrition Follow-Up    RD Follow-up?: Yes    Breanna Cazares RD 02/07/2020 11:12 AM

## 2020-02-08 LAB
BACTERIA SPEC AEROBE CULT: ABNORMAL
BACTERIA SPEC AEROBE CULT: ABNORMAL
BACTERIA UR CULT: ABNORMAL

## 2020-02-10 LAB
BLD PROD TYP BPU: NORMAL
BLOOD UNIT EXPIRATION DATE: NORMAL
BLOOD UNIT TYPE CODE: 6200
BLOOD UNIT TYPE: NORMAL
CODING SYSTEM: NORMAL
DISPENSE STATUS: NORMAL
NUM UNITS TRANS PACKED RBC: NORMAL

## 2024-11-22 NOTE — ASSESSMENT & PLAN NOTE
EEG, as patient does not recall event, and in fact, does not remember being on the floor at all  Consider orthostatics   Update History & Physical    The patient's History and Physical of November 21, 2024 was reviewed with the patient and I examined the patient. There was no change. The surgical site was confirmed by the patient and me.     Plan: The risks, benefits, expected outcome, and alternative to the recommended procedure have been discussed with the patient. Patient understands and wants to proceed with the procedure.     Electronically signed by Cathy Patel DO on 11/22/2024 at 8:21 AM